# Patient Record
Sex: MALE | Race: WHITE | URBAN - METROPOLITAN AREA
[De-identification: names, ages, dates, MRNs, and addresses within clinical notes are randomized per-mention and may not be internally consistent; named-entity substitution may affect disease eponyms.]

---

## 2021-04-26 ENCOUNTER — EMERGENCY (EMERGENCY)
Facility: HOSPITAL | Age: 26
LOS: 1 days | Discharge: HOME | End: 2021-04-26
Attending: EMERGENCY MEDICINE
Payer: MEDICAID

## 2021-04-26 VITALS
RESPIRATION RATE: 16 BRPM | DIASTOLIC BLOOD PRESSURE: 87 MMHG | HEIGHT: 76 IN | TEMPERATURE: 99 F | WEIGHT: 197.09 LBS | OXYGEN SATURATION: 98 % | SYSTOLIC BLOOD PRESSURE: 130 MMHG | HEART RATE: 109 BPM

## 2021-04-26 LAB
ALBUMIN SERPL ELPH-MCNC: 4.5 G/DL — SIGNIFICANT CHANGE UP (ref 3.3–5.2)
ALP SERPL-CCNC: 65 U/L — SIGNIFICANT CHANGE UP (ref 40–120)
ALT FLD-CCNC: 19 U/L — SIGNIFICANT CHANGE UP
ANION GAP SERPL CALC-SCNC: 13 MMOL/L — SIGNIFICANT CHANGE UP (ref 5–17)
ANION GAP SERPL CALC-SCNC: 17 MMOL/L — SIGNIFICANT CHANGE UP (ref 5–17)
AST SERPL-CCNC: 18 U/L — SIGNIFICANT CHANGE UP
BASOPHILS # BLD AUTO: 0.06 K/UL — SIGNIFICANT CHANGE UP (ref 0–0.2)
BASOPHILS # BLD AUTO: 0.07 K/UL — SIGNIFICANT CHANGE UP (ref 0–0.2)
BASOPHILS NFR BLD AUTO: 0.5 % — SIGNIFICANT CHANGE UP (ref 0–2)
BASOPHILS NFR BLD AUTO: 0.5 % — SIGNIFICANT CHANGE UP (ref 0–2)
BILIRUB SERPL-MCNC: 0.5 MG/DL — SIGNIFICANT CHANGE UP (ref 0.4–2)
BLD GP AB SCN SERPL QL: SIGNIFICANT CHANGE UP
BUN SERPL-MCNC: 21 MG/DL — HIGH (ref 8–20)
BUN SERPL-MCNC: 21 MG/DL — HIGH (ref 8–20)
CALCIUM SERPL-MCNC: 8.8 MG/DL — SIGNIFICANT CHANGE UP (ref 8.6–10.2)
CALCIUM SERPL-MCNC: 9.6 MG/DL — SIGNIFICANT CHANGE UP (ref 8.6–10.2)
CHLORIDE SERPL-SCNC: 100 MMOL/L — SIGNIFICANT CHANGE UP (ref 98–107)
CHLORIDE SERPL-SCNC: 103 MMOL/L — SIGNIFICANT CHANGE UP (ref 98–107)
CO2 SERPL-SCNC: 20 MMOL/L — LOW (ref 22–29)
CO2 SERPL-SCNC: 24 MMOL/L — SIGNIFICANT CHANGE UP (ref 22–29)
CREAT SERPL-MCNC: 1 MG/DL — SIGNIFICANT CHANGE UP (ref 0.5–1.3)
CREAT SERPL-MCNC: 1.03 MG/DL — SIGNIFICANT CHANGE UP (ref 0.5–1.3)
EOSINOPHIL # BLD AUTO: 0 K/UL — SIGNIFICANT CHANGE UP (ref 0–0.5)
EOSINOPHIL # BLD AUTO: 0.06 K/UL — SIGNIFICANT CHANGE UP (ref 0–0.5)
EOSINOPHIL NFR BLD AUTO: 0 % — SIGNIFICANT CHANGE UP (ref 0–6)
EOSINOPHIL NFR BLD AUTO: 0.5 % — SIGNIFICANT CHANGE UP (ref 0–6)
GLUCOSE SERPL-MCNC: 146 MG/DL — HIGH (ref 70–99)
GLUCOSE SERPL-MCNC: 157 MG/DL — HIGH (ref 70–99)
HCT VFR BLD CALC: 48.4 % — SIGNIFICANT CHANGE UP (ref 39–50)
HCT VFR BLD CALC: 48.8 % — SIGNIFICANT CHANGE UP (ref 39–50)
HGB BLD-MCNC: 17 G/DL — SIGNIFICANT CHANGE UP (ref 13–17)
HGB BLD-MCNC: 17.3 G/DL — HIGH (ref 13–17)
IMM GRANULOCYTES NFR BLD AUTO: 0.3 % — SIGNIFICANT CHANGE UP (ref 0–1.5)
IMM GRANULOCYTES NFR BLD AUTO: 0.3 % — SIGNIFICANT CHANGE UP (ref 0–1.5)
LACTATE BLDV-MCNC: 3.2 MMOL/L — HIGH (ref 0.5–2)
LITHIUM SERPL-MCNC: 0.12 MMOL/L — LOW (ref 0.5–1.5)
LYMPHOCYTES # BLD AUTO: 0.65 K/UL — LOW (ref 1–3.3)
LYMPHOCYTES # BLD AUTO: 0.78 K/UL — LOW (ref 1–3.3)
LYMPHOCYTES # BLD AUTO: 4.7 % — LOW (ref 13–44)
LYMPHOCYTES # BLD AUTO: 6.9 % — LOW (ref 13–44)
MCHC RBC-ENTMCNC: 31.6 PG — SIGNIFICANT CHANGE UP (ref 27–34)
MCHC RBC-ENTMCNC: 31.7 PG — SIGNIFICANT CHANGE UP (ref 27–34)
MCHC RBC-ENTMCNC: 35.1 GM/DL — SIGNIFICANT CHANGE UP (ref 32–36)
MCHC RBC-ENTMCNC: 35.5 GM/DL — SIGNIFICANT CHANGE UP (ref 32–36)
MCV RBC AUTO: 89.1 FL — SIGNIFICANT CHANGE UP (ref 80–100)
MCV RBC AUTO: 90.1 FL — SIGNIFICANT CHANGE UP (ref 80–100)
MONOCYTES # BLD AUTO: 1.25 K/UL — HIGH (ref 0–0.9)
MONOCYTES # BLD AUTO: 1.38 K/UL — HIGH (ref 0–0.9)
MONOCYTES NFR BLD AUTO: 12.2 % — SIGNIFICANT CHANGE UP (ref 2–14)
MONOCYTES NFR BLD AUTO: 9 % — SIGNIFICANT CHANGE UP (ref 2–14)
NEUTROPHILS # BLD AUTO: 11.89 K/UL — HIGH (ref 1.8–7.4)
NEUTROPHILS # BLD AUTO: 9 K/UL — HIGH (ref 1.8–7.4)
NEUTROPHILS NFR BLD AUTO: 79.6 % — HIGH (ref 43–77)
NEUTROPHILS NFR BLD AUTO: 85.5 % — HIGH (ref 43–77)
PLATELET # BLD AUTO: 229 K/UL — SIGNIFICANT CHANGE UP (ref 150–400)
PLATELET # BLD AUTO: 246 K/UL — SIGNIFICANT CHANGE UP (ref 150–400)
POTASSIUM SERPL-MCNC: 4.5 MMOL/L — SIGNIFICANT CHANGE UP (ref 3.5–5.3)
POTASSIUM SERPL-MCNC: 4.6 MMOL/L — SIGNIFICANT CHANGE UP (ref 3.5–5.3)
POTASSIUM SERPL-SCNC: 4.5 MMOL/L — SIGNIFICANT CHANGE UP (ref 3.5–5.3)
POTASSIUM SERPL-SCNC: 4.6 MMOL/L — SIGNIFICANT CHANGE UP (ref 3.5–5.3)
PROT SERPL-MCNC: 7.3 G/DL — SIGNIFICANT CHANGE UP (ref 6.6–8.7)
RBC # BLD: 5.37 M/UL — SIGNIFICANT CHANGE UP (ref 4.2–5.8)
RBC # BLD: 5.48 M/UL — SIGNIFICANT CHANGE UP (ref 4.2–5.8)
RBC # FLD: 11.8 % — SIGNIFICANT CHANGE UP (ref 10.3–14.5)
RBC # FLD: 11.9 % — SIGNIFICANT CHANGE UP (ref 10.3–14.5)
SODIUM SERPL-SCNC: 137 MMOL/L — SIGNIFICANT CHANGE UP (ref 135–145)
SODIUM SERPL-SCNC: 140 MMOL/L — SIGNIFICANT CHANGE UP (ref 135–145)
WBC # BLD: 11.31 K/UL — HIGH (ref 3.8–10.5)
WBC # BLD: 13.9 K/UL — HIGH (ref 3.8–10.5)
WBC # FLD AUTO: 11.31 K/UL — HIGH (ref 3.8–10.5)
WBC # FLD AUTO: 13.9 K/UL — HIGH (ref 3.8–10.5)

## 2021-04-26 PROCEDURE — 83605 ASSAY OF LACTIC ACID: CPT

## 2021-04-26 PROCEDURE — G1004: CPT

## 2021-04-26 PROCEDURE — 80178 ASSAY OF LITHIUM: CPT

## 2021-04-26 PROCEDURE — 85730 THROMBOPLASTIN TIME PARTIAL: CPT

## 2021-04-26 PROCEDURE — 86850 RBC ANTIBODY SCREEN: CPT

## 2021-04-26 PROCEDURE — 74177 CT ABD & PELVIS W/CONTRAST: CPT

## 2021-04-26 PROCEDURE — 96374 THER/PROPH/DIAG INJ IV PUSH: CPT

## 2021-04-26 PROCEDURE — 86900 BLOOD TYPING SEROLOGIC ABO: CPT

## 2021-04-26 PROCEDURE — 96361 HYDRATE IV INFUSION ADD-ON: CPT

## 2021-04-26 PROCEDURE — 93010 ELECTROCARDIOGRAM REPORT: CPT

## 2021-04-26 PROCEDURE — 86901 BLOOD TYPING SEROLOGIC RH(D): CPT

## 2021-04-26 PROCEDURE — 80048 BASIC METABOLIC PNL TOTAL CA: CPT

## 2021-04-26 PROCEDURE — 85025 COMPLETE CBC W/AUTO DIFF WBC: CPT

## 2021-04-26 PROCEDURE — 85610 PROTHROMBIN TIME: CPT

## 2021-04-26 PROCEDURE — 96375 TX/PRO/DX INJ NEW DRUG ADDON: CPT

## 2021-04-26 PROCEDURE — 80053 COMPREHEN METABOLIC PANEL: CPT

## 2021-04-26 PROCEDURE — 36415 COLL VENOUS BLD VENIPUNCTURE: CPT

## 2021-04-26 PROCEDURE — 99285 EMERGENCY DEPT VISIT HI MDM: CPT

## 2021-04-26 PROCEDURE — 99284 EMERGENCY DEPT VISIT MOD MDM: CPT | Mod: 25

## 2021-04-26 PROCEDURE — 93005 ELECTROCARDIOGRAM TRACING: CPT

## 2021-04-26 PROCEDURE — 74177 CT ABD & PELVIS W/CONTRAST: CPT | Mod: 26,ME

## 2021-04-26 RX ORDER — MULTIVIT WITH MIN/MFOLATE/K2 340-15/3 G
1 POWDER (GRAM) ORAL ONCE
Refills: 0 | Status: COMPLETED | OUTPATIENT
Start: 2021-04-26 | End: 2021-04-26

## 2021-04-26 RX ORDER — SENNA PLUS 8.6 MG/1
2 TABLET ORAL ONCE
Refills: 0 | Status: COMPLETED | OUTPATIENT
Start: 2021-04-26 | End: 2021-04-26

## 2021-04-26 RX ORDER — SODIUM CHLORIDE 9 MG/ML
1000 INJECTION, SOLUTION INTRAVENOUS ONCE
Refills: 0 | Status: COMPLETED | OUTPATIENT
Start: 2021-04-26 | End: 2021-04-26

## 2021-04-26 RX ORDER — SODIUM CHLORIDE 9 MG/ML
1000 INJECTION INTRAMUSCULAR; INTRAVENOUS; SUBCUTANEOUS ONCE
Refills: 0 | Status: COMPLETED | OUTPATIENT
Start: 2021-04-26 | End: 2021-04-26

## 2021-04-26 RX ORDER — ACETAMINOPHEN 500 MG
1000 TABLET ORAL ONCE
Refills: 0 | Status: COMPLETED | OUTPATIENT
Start: 2021-04-26 | End: 2021-04-26

## 2021-04-26 RX ORDER — SODIUM CHLORIDE 9 MG/ML
1000 INJECTION, SOLUTION INTRAVENOUS
Refills: 0 | Status: DISCONTINUED | OUTPATIENT
Start: 2021-04-26 | End: 2021-05-01

## 2021-04-26 RX ORDER — ONDANSETRON 8 MG/1
4 TABLET, FILM COATED ORAL EVERY 6 HOURS
Refills: 0 | Status: DISCONTINUED | OUTPATIENT
Start: 2021-04-26 | End: 2021-05-01

## 2021-04-26 RX ORDER — MORPHINE SULFATE 50 MG/1
4 CAPSULE, EXTENDED RELEASE ORAL ONCE
Refills: 0 | Status: DISCONTINUED | OUTPATIENT
Start: 2021-04-26 | End: 2021-04-26

## 2021-04-26 RX ORDER — SENNA PLUS 8.6 MG/1
1 TABLET ORAL
Qty: 14 | Refills: 0
Start: 2021-04-26 | End: 2021-05-09

## 2021-04-26 RX ORDER — ONDANSETRON 8 MG/1
4 TABLET, FILM COATED ORAL ONCE
Refills: 0 | Status: COMPLETED | OUTPATIENT
Start: 2021-04-26 | End: 2021-04-26

## 2021-04-26 RX ORDER — ACETAMINOPHEN 500 MG
650 TABLET ORAL ONCE
Refills: 0 | Status: COMPLETED | OUTPATIENT
Start: 2021-04-26 | End: 2021-04-26

## 2021-04-26 RX ORDER — DOCUSATE SODIUM 100 MG
1 CAPSULE ORAL
Qty: 28 | Refills: 0
Start: 2021-04-26 | End: 2021-05-09

## 2021-04-26 RX ADMIN — SODIUM CHLORIDE 1000 MILLILITER(S): 9 INJECTION, SOLUTION INTRAVENOUS at 23:05

## 2021-04-26 RX ADMIN — Medication 1 BOTTLE: at 21:26

## 2021-04-26 RX ADMIN — SODIUM CHLORIDE 1000 MILLILITER(S): 9 INJECTION, SOLUTION INTRAVENOUS at 21:23

## 2021-04-26 RX ADMIN — ONDANSETRON 4 MILLIGRAM(S): 8 TABLET, FILM COATED ORAL at 19:27

## 2021-04-26 RX ADMIN — SENNA PLUS 2 TABLET(S): 8.6 TABLET ORAL at 21:27

## 2021-04-26 RX ADMIN — SODIUM CHLORIDE 1000 MILLILITER(S): 9 INJECTION INTRAMUSCULAR; INTRAVENOUS; SUBCUTANEOUS at 15:36

## 2021-04-26 RX ADMIN — Medication 650 MILLIGRAM(S): at 17:10

## 2021-04-26 RX ADMIN — SODIUM CHLORIDE 1000 MILLILITER(S): 9 INJECTION INTRAMUSCULAR; INTRAVENOUS; SUBCUTANEOUS at 21:27

## 2021-04-26 RX ADMIN — SODIUM CHLORIDE 1000 MILLILITER(S): 9 INJECTION INTRAMUSCULAR; INTRAVENOUS; SUBCUTANEOUS at 19:26

## 2021-04-26 RX ADMIN — Medication 650 MILLIGRAM(S): at 15:36

## 2021-04-26 RX ADMIN — MORPHINE SULFATE 4 MILLIGRAM(S): 50 CAPSULE, EXTENDED RELEASE ORAL at 19:27

## 2021-04-26 RX ADMIN — Medication 400 MILLIGRAM(S): at 21:24

## 2021-04-26 RX ADMIN — Medication 1 ENEMA: at 21:26

## 2021-04-26 RX ADMIN — SODIUM CHLORIDE 1000 MILLILITER(S): 9 INJECTION, SOLUTION INTRAVENOUS at 22:20

## 2021-04-26 NOTE — ED PROVIDER NOTE - PATIENT PORTAL LINK FT
You can access the FollowMyHealth Patient Portal offered by [facility] by registering at the following website: [email or web address]. By joining [facility]’s FollowMyHealth portal, you will also be able to view your health information using other applications (apps) compatible with our system.

## 2021-04-26 NOTE — ED PROVIDER NOTE - NS ED ROS FT
Constitutional: no fever, +chills, +decreased appetite   Head: NC, AT   Eyes: no redness   ENMT: no nasal congestion/drainage, no sore throat   CV: no chest pain, no edema  Resp: no cough, no dyspnea  GI: +abdominal pain, no nausea, +vomiting, no diarrhea, +constipation   : no dysuria, no hematuria   Skin: no lesions, no rashes   Neuro: no LOC, no headache, no sensory deficits, no weakness

## 2021-04-26 NOTE — ED PROVIDER NOTE - PROGRESS NOTE DETAILS
Richard: Dr. Cope reviewed CT findings with radiology - no clear transition point however abdomen distended/fluid filled. Differential includes ileus and less likely pseudoobstruction, possibility psych meds c contributing. Colorectal surgery consulted. Patient will be updated. Sloan COLLAZO: patient signed out to me pending colorectal consult. colorectal recommending enema/mg citrate/senna/IVF, rpt labs at 10PM and reassess. Surgery recommending discharge with bowel regimen and GI follow up. Will send medications to pharmacy and discharge.

## 2021-04-26 NOTE — CONSULT NOTE ADULT - ASSESSMENT
26M, history of chronic constipation, presenting with abdominal pain x1-day. CT A/P demonstrating colonic distention with heavy stool burden.    -Colonic distention with heavy stool burden, no evidence of bowel obstruction, No signs of peritonitis   -No acute surgical intervention indicated at this time  -Recommend ED Observation  -Maintain  IVF resuscitation   -Recommend aggressive bowel regimen, including enemas and PO laxatives   -Repeat CBC/BMP/Lactate at 10PM  -Pain control, limit narcotics   -Antinausea medication   -Will f/u repeat labs and maintain ADDIE while inpatient

## 2021-04-26 NOTE — ED PROVIDER NOTE - PHYSICAL EXAMINATION
General: NAD, lying supine   Head: NC, AT  EENT: EOMI, no scleral icterus  Cardiac: tachycardic, no lower extremity edema  Respiratory: CTAB, no respiratory distress   Abdomen: soft, ND, moderate ttp in LLQ and suprapubic region, nonperitonitic  MSK/Vascular: full ROM, soft compartments, warm extremities  Neuro: AAOx3, sensation to light touch intact  Psych: flat affect

## 2021-04-26 NOTE — ED PROVIDER NOTE - CARE PROVIDER_API CALL
Nena Gandara (DO)  Gastroenterology  [address], [address]  [place] [address]  Phone: [phone]  Fax: [phone]  Follow Up Time:

## 2021-04-26 NOTE — CONSULT NOTE ADULT - SUBJECTIVE AND OBJECTIVE BOX
COLORECTAL SURGERY CONSULT    HPI: 26M, PMH of Bipolar 1, on Clozapine, chronic constipation, presenting from [facility] psychiatric facility for evaluation of abdominal pain. Patient reports 8/10, non-radiating, sharp abdominal pain since this morning. Pain is associated with nausea, 2x episodes of emesis, and decreased appetite. No similar symptoms in the past. Patient reports that discomfort is unbearable. Last BM 3-hours ago following an enema. He does not recall his last flatus. Afebrile. Mildly tachycardic. No HA, chills, SOB.        PAST MEDICAL HISTORY:  Constipation        PAST SURGICAL HISTORY:      ALLERGIES:  Lamictal (Unknown)      FAMILY HISTORY: Noncontributory    SOCIAL HISTORY: Denies tobacco, EtOH, illicit substance use.     HOME MEDICATIONS:    MEDICATIONS  (STANDING):    MEDICATIONS  (PRN):      VITALS & I/Os:  Vital Signs Last 24 Hrs  T(C): 37.2 (26 Apr 2021 19:29), Max: 37.4 (26 Apr 2021 18:11)  T(F): 98.9 (26 Apr 2021 19:29), Max: 99.4 (26 Apr 2021 18:11)  HR: 120 (26 Apr 2021 19:29) (109 - 128)  BP: 138/80 (26 Apr 2021 19:29) (130/87 - 145/67)  BP(mean): --  RR: 18 (26 Apr 2021 19:29) (16 - 18)  SpO2: 95% (26 Apr 2021 19:29) (95% - 98%)  CAPILLARY BLOOD GLUCOSE          I&O's Summary      GENERAL: Alert, well developed, in no acute distress.  MENTAL STATUS: AAOx3  HEENT: PERRLA. EOMI. MMM  RESPIRATORY: CTAB. No wheezing, rales or rhonchi.  CARDIOVASCULAR: RRR. No audible murmurs, rubs or gallops.   GASTROINTESTINAL: Abdomen softLY distended, mildly TTP 4/4 quadrants, no rebound or guarding       LABS:                        17.3   13.90 )-----------( 229      ( 26 Apr 2021 15:44 )             48.8     04-26    137  |  100  |  21.0<H>  ----------------------------<  146<H>  4.6   |  24.0  |  1.03    Ca    9.6      26 Apr 2021 15:44    TPro  7.3  /  Alb  4.5  /  TBili  0.5  /  DBili  x   /  AST  18  /  ALT  19  /  AlkPhos  65  04-26    Lactate:    PT/INR - ( 26 Apr 2021 17:01 )   PT: 13.2 sec;   INR: 1.15 ratio         PTT - ( 26 Apr 2021 17:01 )  PTT:34.1 sec          IMAGING:  CT A/P: BOWEL: The colon is distended and fluid-filled across its entirety. The appearance is suggestive of an ileus. There is some well-formed stool in the rectum which is not overly distended nevertheless this could be the cause of the colonic distention. The colon wall is thin throughout except for a short segment at the splenic flexure with associated mild wall thickening and mild inflammatory change in the adjacent fat (5:67). This may represent a small focus of developing colitis. The colonic fluid extends through the ileocecal valve into the distal terminal ileum, but there is no small bowel obstruction.  PERITONEUM: No ascites.  VESSELS: Within normal limits.  RETROPERITONEUM/LYMPH NODES: No lymphadenopathy.  ABDOMINAL WALL: Within normal limits.  BONES: Within normal limits.

## 2021-04-26 NOTE — ED PROVIDER NOTE - ATTENDING CONTRIBUTION TO CARE
Prisca: I performed a face to face evaluation of this patient and performed a full history and physical examination on the patient.  I agree with the resident's history, physical examination, and plan of the patient unless otherwise noted. My brief assessment is as follows: lower abd pain with constipation and chills starting overnight. given laxative/enema, had hard bm, no relief. no testicular pain, no urianry symptoms. denies other complaints. from [facility]. non toxic, mild discomfort. ctab, tachy ~115, abd soft with lower ttp R>L. neuro intact. labs, ct, symptom control, reassess

## 2021-04-26 NOTE — ED ADULT TRIAGE NOTE - CHIEF COMPLAINT QUOTE
pt BIBA from [facility] for 5/10 abdominal pain and diarrhea since 2am. was given laxatives with no relief of pain. denies fever, chills, urinary sx. [facility] aide @ bedside

## 2021-04-26 NOTE — ED PROVIDER NOTE - OBJECTIVE STATEMENT
26 year old male from [place] for bipolar disorder who presents with abdominal pain. At 02:00 the patient woke up from sleep with 7/10 stabbing/cramping abdominal pain worst in LLQ with some radiation to the right. States the pain is constant. Has decreased appetite, 1 episode of NB emesis, and has chills. He has constipation at baseline and was given fleet enema, mag citrate, and ducolax PTA; last BM about 3 hours ago.   No history of abdominal surgery. Family history: No family history of IBD, Celiac, Crohn's, appendecitis

## 2021-04-26 NOTE — ED ADULT NURSE REASSESSMENT NOTE - NS ED NURSE REASSESS COMMENT FT1
pts aide at bedside  pt sleeping off and on.    martina CT scan.   vomited after CT scan, for clear yellow.   waiting MD.   reassurance prov

## 2021-04-26 NOTE — ED ADULT NURSE REASSESSMENT NOTE - NS ED NURSE REASSESS COMMENT FT1
pt with large BM. walked back to bed and states relief of discomfort. no complaints. no distress noted. pending sx reeval

## 2021-04-26 NOTE — ED PROVIDER NOTE - CLINICAL SUMMARY MEDICAL DECISION MAKING FREE TEXT BOX
26 year old male from [place] for bipolar disorder who presents with abdominal pain. Patient tachycardic in 120s initially. Labs notable for WBC of 13.9. CT a/p pending. 26 year old male from [place] for bipolar disorder who presents with abdominal pain. Patient tachycardic in 120s initially. Labs notable for WBC of 13.9. CT a/p showing ileus vs less likely pseudocyst. Colorectal surgery consulted.

## 2021-04-26 NOTE — ED ADULT NURSE REASSESSMENT NOTE - NS ED NURSE REASSESS COMMENT FT1
received pt aao x4. respirations even and unlabored. skin color wnl warm and dry. c/o abd pain. tachy on cm 120-130bpm. MD aware, medicated as ordered wcm lv

## 2021-04-26 NOTE — CHART NOTE - NSCHARTNOTEFT_GEN_A_CORE
Patient examined s/p bowel regimen. Patient with multiple normal appearing bowel movements. Abdominal pain improved. No further episodes of nausea or vomiting. Recommend maintain bowel regimen on discharge. Patient should follow-up with GI as outpatient given history of chronic constipation. Cleared from surgical standpoint for disposition per ED.

## 2021-04-26 NOTE — ED ADULT NURSE NOTE - CHIEF COMPLAINT QUOTE
The patient has evidence of elevated CPK in the past. Isoenzymes were ordered which showed a predominantly MM subtype. He was advised to stop working out and stop his statin medicine in the past and this only seems to have improved his CPK somewhat. Patient denies muscle aches or pains.   pt BIBA from [facility] for 5/10 abdominal pain and diarrhea since 2am. was given laxatives with no relief of pain. denies fever, chills, urinary sx. [facility] aide @ bedside

## 2021-04-26 NOTE — ED PROVIDER NOTE - NSFOLLOWUPINSTRUCTIONS_ED_ALL_ED_FT
Constipation is when a person has fewer than three bowel movements in a week, has difficulty having a bowel movement, or has stools (feces) that are dry, hard, or larger than normal. Constipation may be caused by an underlying condition. It may become worse with age if a person takes certain medicines and does not take in enough fluids. Constipation is when a person has fewer than three bowel movements in a week, has difficulty having a bowel movement, or has stools (feces) that are dry, hard, or larger than normal. Constipation may be caused by an underlying condition. It may become worse with age if a person takes certain medicines and does not take in enough fluids.    Take the senna and colace as prescribed

## 2021-04-27 VITALS
RESPIRATION RATE: 18 BRPM | TEMPERATURE: 98 F | OXYGEN SATURATION: 99 % | HEART RATE: 112 BPM | SYSTOLIC BLOOD PRESSURE: 132 MMHG | DIASTOLIC BLOOD PRESSURE: 80 MMHG

## 2021-04-27 RX ADMIN — Medication 1000 MILLIGRAM(S): at 00:31

## 2021-04-27 RX ADMIN — SODIUM CHLORIDE 1000 MILLILITER(S): 9 INJECTION, SOLUTION INTRAVENOUS at 00:32

## 2021-04-27 NOTE — ED ADULT NURSE REASSESSMENT NOTE - NS ED NURSE REASSESS COMMENT FT1
pt heart rate still elevated. pt has no complaints. dr padilla aware. pt still cleared for dc and no new orders at this time.

## 2021-05-03 ENCOUNTER — APPOINTMENT (OUTPATIENT)
Dept: DERMATOLOGY | Facility: CLINIC | Age: 26
End: 2021-05-03
Payer: MEDICAID

## 2021-05-03 PROCEDURE — 99202 OFFICE O/P NEW SF 15 MIN: CPT

## 2021-05-03 PROCEDURE — 99072 ADDL SUPL MATRL&STAF TM PHE: CPT

## 2021-07-20 PROBLEM — Z00.00 ENCOUNTER FOR PREVENTIVE HEALTH EXAMINATION: Status: ACTIVE | Noted: 2021-07-20

## 2021-08-11 ENCOUNTER — OUTPATIENT (OUTPATIENT)
Dept: OUTPATIENT SERVICES | Facility: HOSPITAL | Age: 26
LOS: 1 days | End: 2021-08-11
Payer: COMMERCIAL

## 2021-08-11 ENCOUNTER — APPOINTMENT (OUTPATIENT)
Dept: NEUROLOGY | Facility: CLINIC | Age: 26
End: 2021-08-11

## 2021-08-11 DIAGNOSIS — Z00.8 ENCOUNTER FOR OTHER GENERAL EXAMINATION: ICD-10-CM

## 2021-08-11 PROCEDURE — U0005: CPT

## 2021-08-11 PROCEDURE — U0003: CPT

## 2021-09-21 ENCOUNTER — NON-APPOINTMENT (OUTPATIENT)
Age: 26
End: 2021-09-21

## 2021-09-21 ENCOUNTER — APPOINTMENT (OUTPATIENT)
Dept: OPHTHALMOLOGY | Facility: CLINIC | Age: 26
End: 2021-09-21
Payer: MEDICAID

## 2021-09-21 PROCEDURE — 99072 ADDL SUPL MATRL&STAF TM PHE: CPT

## 2021-09-21 PROCEDURE — 92004 COMPRE OPH EXAM NEW PT 1/>: CPT

## 2021-09-27 ENCOUNTER — APPOINTMENT (OUTPATIENT)
Dept: NEUROLOGY | Facility: CLINIC | Age: 26
End: 2021-09-27
Payer: MEDICAID

## 2021-09-27 VITALS
DIASTOLIC BLOOD PRESSURE: 72 MMHG | OXYGEN SATURATION: 98 % | HEART RATE: 102 BPM | SYSTOLIC BLOOD PRESSURE: 108 MMHG | BODY MASS INDEX: 30.89 KG/M2 | HEIGHT: 73 IN | WEIGHT: 233.1 LBS | TEMPERATURE: 98.8 F

## 2021-09-27 DIAGNOSIS — F41.9 ANXIETY DISORDER, UNSPECIFIED: ICD-10-CM

## 2021-09-27 DIAGNOSIS — F32.9 ANXIETY DISORDER, UNSPECIFIED: ICD-10-CM

## 2021-09-27 DIAGNOSIS — Z78.9 OTHER SPECIFIED HEALTH STATUS: ICD-10-CM

## 2021-09-27 DIAGNOSIS — Z01.818 ENCOUNTER FOR OTHER PREPROCEDURAL EXAMINATION: ICD-10-CM

## 2021-09-27 DIAGNOSIS — Z82.0 FAMILY HISTORY OF EPILEPSY AND OTHER DISEASES OF THE NERVOUS SYSTEM: ICD-10-CM

## 2021-09-27 PROCEDURE — 99205 OFFICE O/P NEW HI 60 MIN: CPT

## 2021-09-27 RX ORDER — METOPROLOL TARTRATE 75 MG/1
TABLET, FILM COATED ORAL
Refills: 0 | Status: ACTIVE | COMMUNITY

## 2021-09-27 RX ORDER — CLOZAPINE 200 MG/1
200 TABLET, ORALLY DISINTEGRATING ORAL
Refills: 0 | Status: ACTIVE | COMMUNITY

## 2021-09-27 NOTE — ASSESSMENT
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 Admit to Epilepsy Monitoring Unit (EMU) for further characterization. MRI brain.\par \par \par - Admit to EMU 10/6. The purposes of the EMU admission are 1) to differentiate epileptic from nonepileptic events and 2) to establish the specific type of epilepsy in poorly characterized seizure types where such characterization is medically necessary to select the most appropriate therapeutic regimen. The expected length of stay is 3-4 days.\par \par - MRI brain w/o, epilepsy protocol\par \par - Patient was educated regarding risks and driving privileges associated with the [facility] Guidelines. Patient is educated on seizure precautions, including no driving, no operating machinery, no swimming or bathing, no climbing heights, or engage in any risky activities during which a seizure could cause further injury to pt or others. \par  \par - f/u in 3 months\par \par \par Personally reviewed all images, labs and other studies. More than 50% of time spent on counseling and educating patient on differential, workup, disease course, and treatment/management. All questions and concerns answered and addressed in detail to patient's complete satisfaction. Patient verbalized understanding and agreed to plan.\par

## 2021-09-27 NOTE — CONSULT LETTER
[Dear  ___] : Dear  [unfilled], [Sincerely,] : Sincerely, [FreeTextEntry1] : I had the pleasure of seeing your patient, DEON AGOSTO, in my office today. Please see my note below. \par \par If you have any questions, please do not hesitate to contact me.  [FreeTextEntry3] : Gold Noel MD\par Director, Epilepsy/EMU\par [facility] \par [facility] [place]\par [address] [place] [address] \par Tel: [phone]; Fax: [phone]

## 2021-09-27 NOTE — PHYSICAL EXAM
[FreeTextEntry1] : GENERAL PHYSICAL EXAM:\par GEN: no distress, restricted affect\par HEENT: NCAT, OP clear\par EYES: sclera white, conjunctiva clear, no nystagmus\par NECK: supple\par CV: RRR    		\par PULM: CTAB, no wheezing\par GI: soft ABD, +BS, NT, ND\par EXT: peripheral pulse intact, no cyanosis\par MSK: muscle tone and strength normal\par SKIN: warm, dry, no rash or lesion on exposed skin \par \par NEUROLOGICAL EXAM:\par Mental Status\par Orientation: alert and oriented to person, place, time, and situation \par Language: clear and fluent\par \par Cranial Nerves\par II: full visual fields intact \par III, IV, VI: PERRL, EOMI\par V, VII: facial sensation and movement intact and symmetric \par VIII: hearing intact \par IX, X: uvula midline, soft palate elevates normally \par XI: BL shoulder shrug intact \par XII: tongue midline\par \par Motor\par Shoulder abd: 5 (R), 5 (L)\par EF/EE: 5 (R), 5 (L)\par WF/WE: 5 (R), 5 (L)\par hand : 5 (R), 5 (L)\par HF/HE: 5 (R), 5 (L)\par KF/KE: 5 (R), 5 (L)\par DF/PF: 5 (R), 5 (L)                \par Tone and bulk are normal in upper and lower limbs\par No pronator drift\par \par Sensation\par Intact to light touch and pinprick in all 4 EXTs\par \par Reflex\par 2+ in BL biceps, triceps, brachioradialis, patella, ankle                                    \par Plantar responses downward bilaterally\par \par Coordination\par Normal FTN bilaterally\par \par Gait\par Normal stance, stride, and pivot turn\par Tandem walk intact\par Negative Romberg\par \par

## 2021-09-27 NOTE — HISTORY OF PRESENT ILLNESS
[FreeTextEntry1] : PCP: Dr. Lynch \par \par This is a 26 year-old LH male with a history of depression and anxiety who is referred by PCP ALDEN LYNCH MD for evaluation and management of seizure and seizure-like events. \par \par Patient has had 2 BTC sz’s (type #1) in teenage and early adulthood. Then earlier this year, began to have myoclonic twitching of the jaw (type #2) and zoning out episodes (type #3).\par \par SEIZURE/SPELL DESCRIPTION AND TYPE:\par Type #1\par Severity: BTC sz\par Onset: 13yo\par Quality & associated signs/symptoms: no aura -> LOC, full body convulsion\par Duration:\par Timing: x1 at 13yo, x1 at 15yo\par Modifying factors: triggered by flashing light while playing video game\par Diurnal Variation: none\par \par Type #2\par Severity: myoclonic twitching of mouth/jaw\par Onset: early 2021\par Quality & associated signs/symptoms: few myoclonic jerking of the jaw in a role\par Duration: few seconds\par Timing: maybe once a mouth\par Modifying factors: triggered by fatigue\par Diurnal Variation: none\par \par Type #3\par Severity: zoning out\par Onset: early 2021\par Quality & associated signs/symptoms: zoning out, immediately back to baseline\par Duration: few seconds\par Timing: once every few months\par Modifying factors: triggered by dehydration or fatigue  \par Diurnal Variation: none\par \par SEIZURE RISK FACTORS:\par Sister has epilepsy, onset 20s, characterized by focal aware, focal impaired awareness and focal to bilateral tonic clonic sz's. No history of febrile seizure, TBI, CNS infection.\par \par CURRENT AED:\par none\par \par PREVIOUS AED:\par LTG - for bipolar, rash\par \par IMAGING: \par no MRI brain\par \par NEUROPHYSIOLOGY:\par none\par \par NEUROPSYCHOLOGY: \par none\par \par PMH:\par bipolar disorder\par \par PSH:\par drainage of hydrocele\par \par ALLERGIES:\par LTG - rash\par \par FH:\par Sister has epilepsy, onset 20s, characterized by focal aware, focal impaired awareness and focal to bilateral tonic clonic sz's.\par \par SH:\par No E/T/D. Has license but not driving. Not working. [facility]

## 2021-10-03 ENCOUNTER — APPOINTMENT (OUTPATIENT)
Dept: DISASTER EMERGENCY | Facility: CLINIC | Age: 26
End: 2021-10-03

## 2021-10-03 ENCOUNTER — LABORATORY RESULT (OUTPATIENT)
Age: 26
End: 2021-10-03

## 2021-10-04 PROBLEM — Z00.00 ENCOUNTER FOR PREVENTIVE HEALTH EXAMINATION: Noted: 2021-10-04

## 2021-10-06 ENCOUNTER — INPATIENT (INPATIENT)
Facility: HOSPITAL | Age: 26
LOS: 1 days | Discharge: HOME | DRG: 101 | End: 2021-10-08
Attending: INTERNAL MEDICINE | Admitting: INTERNAL MEDICINE
Payer: MEDICAID

## 2021-10-06 VITALS
TEMPERATURE: 98 F | DIASTOLIC BLOOD PRESSURE: 80 MMHG | SYSTOLIC BLOOD PRESSURE: 117 MMHG | RESPIRATION RATE: 18 BRPM | HEART RATE: 69 BPM | OXYGEN SATURATION: 98 %

## 2021-10-06 DIAGNOSIS — G40.A11 ABSENCE EPILEPTIC SYNDROME, INTRACTABLE, WITH STATUS EPILEPTICUS: ICD-10-CM

## 2021-10-06 LAB
ALBUMIN SERPL ELPH-MCNC: 4.3 G/DL — SIGNIFICANT CHANGE UP (ref 3.3–5.2)
ALP SERPL-CCNC: 69 U/L — SIGNIFICANT CHANGE UP (ref 40–120)
ALT FLD-CCNC: 17 U/L — SIGNIFICANT CHANGE UP
ANION GAP SERPL CALC-SCNC: 11 MMOL/L — SIGNIFICANT CHANGE UP (ref 5–17)
AST SERPL-CCNC: 19 U/L — SIGNIFICANT CHANGE UP
BASOPHILS # BLD AUTO: 0.07 K/UL — SIGNIFICANT CHANGE UP (ref 0–0.2)
BASOPHILS NFR BLD AUTO: 1.1 % — SIGNIFICANT CHANGE UP (ref 0–2)
BILIRUB SERPL-MCNC: 0.4 MG/DL — SIGNIFICANT CHANGE UP (ref 0.4–2)
BUN SERPL-MCNC: 11.4 MG/DL — SIGNIFICANT CHANGE UP (ref 8–20)
CALCIUM SERPL-MCNC: 9.9 MG/DL — SIGNIFICANT CHANGE UP (ref 8.6–10.2)
CHLORIDE SERPL-SCNC: 107 MMOL/L — SIGNIFICANT CHANGE UP (ref 98–107)
CO2 SERPL-SCNC: 24 MMOL/L — SIGNIFICANT CHANGE UP (ref 22–29)
CREAT SERPL-MCNC: 1.08 MG/DL — SIGNIFICANT CHANGE UP (ref 0.5–1.3)
EOSINOPHIL # BLD AUTO: 0.12 K/UL — SIGNIFICANT CHANGE UP (ref 0–0.5)
EOSINOPHIL NFR BLD AUTO: 1.8 % — SIGNIFICANT CHANGE UP (ref 0–6)
GLUCOSE SERPL-MCNC: 83 MG/DL — SIGNIFICANT CHANGE UP (ref 70–99)
HCT VFR BLD CALC: 41.1 % — SIGNIFICANT CHANGE UP (ref 39–50)
HGB BLD-MCNC: 14.7 G/DL — SIGNIFICANT CHANGE UP (ref 13–17)
IMM GRANULOCYTES NFR BLD AUTO: 0.3 % — SIGNIFICANT CHANGE UP (ref 0–1.5)
INR BLD: 1.08 RATIO — SIGNIFICANT CHANGE UP (ref 0.88–1.16)
LYMPHOCYTES # BLD AUTO: 1.65 K/UL — SIGNIFICANT CHANGE UP (ref 1–3.3)
LYMPHOCYTES # BLD AUTO: 25 % — SIGNIFICANT CHANGE UP (ref 13–44)
MAGNESIUM SERPL-MCNC: 1.9 MG/DL — SIGNIFICANT CHANGE UP (ref 1.8–2.6)
MCHC RBC-ENTMCNC: 31.6 PG — SIGNIFICANT CHANGE UP (ref 27–34)
MCHC RBC-ENTMCNC: 35.8 GM/DL — SIGNIFICANT CHANGE UP (ref 32–36)
MCV RBC AUTO: 88.4 FL — SIGNIFICANT CHANGE UP (ref 80–100)
MONOCYTES # BLD AUTO: 0.57 K/UL — SIGNIFICANT CHANGE UP (ref 0–0.9)
MONOCYTES NFR BLD AUTO: 8.6 % — SIGNIFICANT CHANGE UP (ref 2–14)
NEUTROPHILS # BLD AUTO: 4.18 K/UL — SIGNIFICANT CHANGE UP (ref 1.8–7.4)
NEUTROPHILS NFR BLD AUTO: 63.2 % — SIGNIFICANT CHANGE UP (ref 43–77)
PHOSPHATE SERPL-MCNC: 3.8 MG/DL — SIGNIFICANT CHANGE UP (ref 2.4–4.7)
PLATELET # BLD AUTO: 170 K/UL — SIGNIFICANT CHANGE UP (ref 150–400)
POTASSIUM SERPL-MCNC: 4.5 MMOL/L — SIGNIFICANT CHANGE UP (ref 3.5–5.3)
POTASSIUM SERPL-SCNC: 4.5 MMOL/L — SIGNIFICANT CHANGE UP (ref 3.5–5.3)
PROT SERPL-MCNC: 6.9 G/DL — SIGNIFICANT CHANGE UP (ref 6.6–8.7)
PROTHROM AB SERPL-ACNC: 12.5 SEC — SIGNIFICANT CHANGE UP (ref 10.6–13.6)
RBC # BLD: 4.65 M/UL — SIGNIFICANT CHANGE UP (ref 4.2–5.8)
RBC # FLD: 11.7 % — SIGNIFICANT CHANGE UP (ref 10.3–14.5)
SODIUM SERPL-SCNC: 141 MMOL/L — SIGNIFICANT CHANGE UP (ref 135–145)
WBC # BLD: 6.61 K/UL — SIGNIFICANT CHANGE UP (ref 3.8–10.5)
WBC # FLD AUTO: 6.61 K/UL — SIGNIFICANT CHANGE UP (ref 3.8–10.5)

## 2021-10-06 PROCEDURE — 99223 1ST HOSP IP/OBS HIGH 75: CPT

## 2021-10-06 PROCEDURE — 99253 IP/OBS CNSLTJ NEW/EST LOW 45: CPT

## 2021-10-06 RX ORDER — SENNA PLUS 8.6 MG/1
2 TABLET ORAL AT BEDTIME
Refills: 0 | Status: DISCONTINUED | OUTPATIENT
Start: 2021-10-06 | End: 2021-10-08

## 2021-10-06 RX ORDER — CLOZAPINE 150 MG/1
225 TABLET, ORALLY DISINTEGRATING ORAL AT BEDTIME
Refills: 0 | Status: DISCONTINUED | OUTPATIENT
Start: 2021-10-06 | End: 2021-10-08

## 2021-10-06 RX ORDER — METOPROLOL TARTRATE 50 MG
25 TABLET ORAL
Refills: 0 | Status: DISCONTINUED | OUTPATIENT
Start: 2021-10-06 | End: 2021-10-08

## 2021-10-06 RX ORDER — CHOLECALCIFEROL (VITAMIN D3) 125 MCG
2000 CAPSULE ORAL DAILY
Refills: 0 | Status: DISCONTINUED | OUTPATIENT
Start: 2021-10-06 | End: 2021-10-08

## 2021-10-06 RX ORDER — ENOXAPARIN SODIUM 100 MG/ML
40 INJECTION SUBCUTANEOUS DAILY
Refills: 0 | Status: DISCONTINUED | OUTPATIENT
Start: 2021-10-06 | End: 2021-10-08

## 2021-10-06 RX ORDER — BUPROPION HYDROCHLORIDE 150 MG/1
300 TABLET, EXTENDED RELEASE ORAL DAILY
Refills: 0 | Status: DISCONTINUED | OUTPATIENT
Start: 2021-10-06 | End: 2021-10-08

## 2021-10-06 RX ORDER — INFLUENZA VIRUS VACCINE 15; 15; 15; 15 UG/.5ML; UG/.5ML; UG/.5ML; UG/.5ML
0.5 SUSPENSION INTRAMUSCULAR ONCE
Refills: 0 | Status: DISCONTINUED | OUTPATIENT
Start: 2021-10-06 | End: 2021-10-08

## 2021-10-06 RX ORDER — MULTIVIT WITH MIN/MFOLATE/K2 340-15/3 G
1 POWDER (GRAM) ORAL ONCE
Refills: 0 | Status: DISCONTINUED | OUTPATIENT
Start: 2021-10-06 | End: 2021-10-08

## 2021-10-06 RX ADMIN — Medication 25 MILLIGRAM(S): at 18:31

## 2021-10-06 RX ADMIN — SENNA PLUS 2 TABLET(S): 8.6 TABLET ORAL at 22:39

## 2021-10-06 RX ADMIN — CLOZAPINE 225 MILLIGRAM(S): 150 TABLET, ORALLY DISINTEGRATING ORAL at 22:39

## 2021-10-06 NOTE — CONSULT NOTE ADULT - ASSESSMENT
26 year-old  male with a history of depression and anxiety who presents to EMU to differentiate epileptic from nonepileptic events. Personally reviewed all imagines, labs and other studies.    Recommendation:  - cvEEG  - continue all home meds  - tele monitor  - bed rail up at all times  - bed rail padding  - OOB only with supervision and assist  - seizure precaution  - medical management and support care per primary team       Thank you for allowing Epilepsy to participate in the care of this patient.   ______________________  Gold Noel MD   Director, Epilepsy/EMU - [facility]   Epilepsy Consult #: [phone] ([phone])

## 2021-10-06 NOTE — H&P ADULT - HISTORY OF PRESENT ILLNESS
26 year-old  male with a history of HTN, Bipolar, constipation presents to EMU to differentiate epileptic from nonepileptic events. Patient has had 2 BTC sz’s (type #1) in teenage and early adulthood. Then earlier this year, began to have myoclonic twitching of the jaw (type #2) and zoning out episodes (type #3).    SH- Denies smoking, alcohol use or other drug use; lives on the grounds of [place]  FH- Denies any medical condition in his family

## 2021-10-06 NOTE — H&P ADULT - NSHPPHYSICALEXAM_GEN_ALL_CORE
Vital Signs Last 24 Hrs  T(C): 36.4 (10-06-21 @ 17:11), Max: 36.6 (10-06-21 @ 14:21)  T(F): 97.5 (10-06-21 @ 17:11), Max: 97.9 (10-06-21 @ 14:21)  HR: 79 (10-06-21 @ 17:11) (69 - 79)  BP: 112/77 (10-06-21 @ 17:11) (112/77 - 117/80)  BP(mean): --  RR: 18 (10-06-21 @ 17:11) (18 - 18)  SpO2: 99% (10-06-21 @ 17:11) (98% - 99%)    GENERAL: Comfortable, unable to give clear history/ medications  HEAD:  Atraumatic, Normocephalic  EYES: EOMI, conjunctiva and sclera clear  NECK: Supple, No JVD, Normal thyroid  NERVOUS SYSTEM:  Alert & Oriented X 3, Motor Strength 5/5 B/L upper and lower extremities  CHEST/LUNG: CTA bilaterally; No rales, rhonchi, wheezing, or rubs  HEART: Regular rate and rhythm; No murmurs, rubs, or gallops  ABDOMEN: Soft, Nontender, Nondistended; Bowel sounds present  EXTREMITIES:  2+ Peripheral Pulses, No clubbing, cyanosis, or edema  SKIN: No rashes or lesions

## 2021-10-06 NOTE — CONSULT NOTE ADULT - SUBJECTIVE AND OBJECTIVE BOX
[facility]                                                                     MD Mere Richmond DO                                              Epilepsy Consult #: [phone] ([phone])                                               Office: [address] [place] [address], [address]                                                 Phone: [phone]; Fax: [phone]                            ==============================================    EPILEPSY INITIAL CONSULT NOTE      HPI  This is a 26 year-old LH male with a history of depression and anxiety who presents to EMU to differentiate epileptic from nonepileptic events. Patient has had 2 BTC sz’s (type #1) in teenage and early adulthood. Then earlier this year, began to have myoclonic twitching of the jaw (type #2) and zoning out episodes (type #3).    SEIZURE/SPELL DESCRIPTION AND TYPE:  Type #1  Severity: BTC sz  Onset: 15yo  Quality & associated signs/symptoms: no aura -> LOC, full body convulsion  Duration:  Timing: x1 at 15yo, x1 at 17yo  Modifying factors: triggered by flashing light while playing video game  Diurnal Variation: none    Type #2  Severity: myoclonic twitching of mouth/jaw  Onset: early 2021  Quality & associated signs/symptoms: few myoclonic jerking of the jaw in a role  Duration: few seconds  Timing: maybe once a mouth  Modifying factors: triggered by fatigue  Diurnal Variation: none    Type #3  Severity: zoning out  Onset: early 2021  Quality & associated signs/symptoms: zoning out, immediately back to baseline  Duration: few seconds  Timing: once every few months  Modifying factors: triggered by dehydration or fatigue   Diurnal Variation: none    SEIZURE RISK FACTORS:  Sister has epilepsy, onset 20s, characterized by focal aware, focal impaired awareness and focal to bilateral tonic clonic sz's. No history of febrile seizure, TBI, CNS infection.    CURRENT AED:  none    PREVIOUS AED:  LTG - for bipolar, rash    IMAGING:   no MRI brain    NEUROPHYSIOLOGY:  none    NEUROPSYCHOLOGY:   none    PMH:  bipolar disorder    PSH:  drainage of hydrocele    HOME MEDICATIONS:     ALLERGIES:   Lamictal     FAMILY HISTORY:  Sister has epilepsy, onset 20s, characterized by focal aware, focal impaired awareness and focal to bilateral tonic clonic sz's.    SOCIAL HISTORY:   No E/T/D. Has license but not driving. Not working. Residing at [facility], [facility].    ROS:  Negative for constitutional, skin, eyes, ENT, respiratory, cardiovascular, gastrointestinal, genitourinary, musculoskeletal, neurologic, psychiatric, hematology/lymphatics, endocrine, allergic/immunologic.      VITALS:  T(C): --  HR: --  BP: --  ABP: --  RR: --  SpO2: --  CVP(cm H2O): --    GENERAL PHYSICAL EXAM:  GEN: no distress  HEENT: NCAT, OP clear  EYES: sclera white, conjunctiva clear, no nystagmus  NECK: supple  CV: RRR, no murmur     		  PULM: CTAB, no wheezing  ABD: soft ABD, +BS, NT  EXT: peripheral pulse intact, no cyanosis  MSK: muscle tone and strength normal  SKIN: warm, dry    NEUROLOGICAL EXAM:  Mental Status  Orientation: alert and oriented to person, place, time, and situation   Language: clear and fluent    Cranial Nerves  II: full visual fields intact   III, IV, VI: PERRL, EOMI  V, VII: facial sensation and movement intact and symmetric   VIII: hearing intact   IX, X: uvula midline, soft palate elevates normally   XI: BL shoulder shrug intact   XII: tongue midline    Motor  5/5 BUE and BLE                 Tone and bulk are normal in upper and lower limbs  No pronator drift    Sensation  Intact to light touch and pinprick in all 4 EXTs    Reflex  2+ in BL biceps and patella                                   Plantar responses downward bilaterally    Coordination  Normal FTN bilaterally    Gait  Deferred      LABS:   pending                                                                                          [facility]                                                                     MD Mere Richmond DO                                              Epilepsy Consult #: [phone] ([phone])                                               Office: [address] [place] [address], [address]                                                 Phone: [phone]; Fax: [phone]                            ==============================================    EPILEPSY INITIAL CONSULT NOTE      HPI  This is a 26 year-old LH male with a history of depression and anxiety who presents to EMU to differentiate epileptic from nonepileptic events. Patient has had 2 BTC sz’s (type #1) in teenage and early adulthood. Then earlier this year, began to have myoclonic twitching of the jaw (type #2) and zoning out episodes (type #3).    SEIZURE/SPELL DESCRIPTION AND TYPE:  Type #1  Severity: BTC sz  Onset: 13yo  Quality & associated signs/symptoms: no aura -> LOC, full body convulsion  Duration:  Timing: x1 at 13yo, x1 at 15yo  Modifying factors: triggered by flashing light while playing video game  Diurnal Variation: none    Type #2  Severity: myoclonic twitching of mouth/jaw  Onset: early 2021  Quality & associated signs/symptoms: few myoclonic jerking of the jaw in a role  Duration: few seconds  Timing: maybe once a mouth  Modifying factors: triggered by fatigue  Diurnal Variation: none    Type #3  Severity: zoning out  Onset: early 2021  Quality & associated signs/symptoms: zoning out, immediately back to baseline  Duration: few seconds  Timing: once every few months  Modifying factors: triggered by dehydration or fatigue   Diurnal Variation: none    SEIZURE RISK FACTORS:  Sister has epilepsy, onset 20s, characterized by focal aware, focal impaired awareness and focal to bilateral tonic clonic sz's. No history of febrile seizure, TBI, CNS infection.    CURRENT AED:  none    PREVIOUS AED:  LTG - for bipolar, rash    IMAGING:   no MRI brain    NEUROPHYSIOLOGY:  none    NEUROPSYCHOLOGY:   none    PMH:  bipolar disorder    PSH:  drainage of hydrocele    MEDICATIONS  (STANDING):  buPROPion XL (24-Hour) . 300 milliGRAM(s) Oral daily  cholecalciferol 2000 Unit(s) Oral daily  cloZAPine 225 milliGRAM(s) Oral at bedtime  enoxaparin Injectable 40 milliGRAM(s) SubCutaneous daily  influenza   Vaccine 0.5 milliLiter(s) IntraMuscular once  metoprolol tartrate 25 milliGRAM(s) Oral two times a day  senna 2 Tablet(s) Oral at bedtime    ALLERGIES:   Lamictal     FAMILY HISTORY:  Sister has epilepsy, onset 20s, characterized by focal aware, focal impaired awareness and focal to bilateral tonic clonic sz's.    SOCIAL HISTORY:   No E/T/D. Has license but not driving. Not working. Residing at [facility].    ROS:  Negative for constitutional, skin, eyes, ENT, respiratory, cardiovascular, gastrointestinal, genitourinary, musculoskeletal, neurologic, psychiatric, hematology/lymphatics, endocrine, allergic/immunologic.    Vital Signs Last 24 Hrs  T(C): 36.6 (06 Oct 2021 14:21), Max: 36.6 (06 Oct 2021 14:21)  T(F): 97.9 (06 Oct 2021 14:21), Max: 97.9 (06 Oct 2021 14:21)  HR: 69 (06 Oct 2021 14:21) (69 - 69)  BP: 117/80 (06 Oct 2021 14:21) (117/80 - 117/80)  BP(mean): --  RR: 18 (06 Oct 2021 14:21) (18 - 18)  SpO2: 98% (06 Oct 2021 14:21) (98% - 98%)    GENERAL PHYSICAL EXAM:  GEN: no distress  HEENT: NCAT, OP clear  EYES: sclera white, conjunctiva clear, no nystagmus  NECK: supple  CV: RRR, no murmur     		  PULM: CTAB, no wheezing  ABD: soft ABD, +BS, NT  EXT: peripheral pulse intact, no cyanosis  MSK: muscle tone and strength normal  SKIN: warm, dry    NEUROLOGICAL EXAM:  Mental Status  Orientation: alert and oriented to person, place, time, and situation   Language: clear and fluent    Cranial Nerves  II: full visual fields intact   III, IV, VI: PERRL, EOMI  V, VII: facial sensation and movement intact and symmetric   VIII: hearing intact   IX, X: uvula midline, soft palate elevates normally   XI: BL shoulder shrug intact   XII: tongue midline    Motor  5/5 BUE and BLE                 Tone and bulk are normal in upper and lower limbs  No pronator drift    Sensation  Intact to light touch and pinprick in all 4 EXTs    Reflex  2+ in BL biceps and patella                                   Plantar responses downward bilaterally    Coordination  Normal FTN bilaterally    Gait  Deferred      LABS:   pending

## 2021-10-06 NOTE — H&P ADULT - ASSESSMENT
26 year-old  male with a history of HTN, Bipolar, constipation presents to EMU to differentiate epileptic from nonepileptic events.    # To Classify Sz  - cvEEG  - continue all home meds  - tele monitor  - bed rail up at all times  - bed rail padding  - OOB only with supervision and assist    # HTN  - Cont Metoprolol (?taking tartrate QD?)    # Bipolar  - Cont Clozapine and Bupropion    Lovenox

## 2021-10-07 ENCOUNTER — APPOINTMENT (OUTPATIENT)
Dept: NEUROLOGY | Facility: CLINIC | Age: 26
End: 2021-10-07

## 2021-10-07 LAB
COVID-19 SPIKE DOMAIN AB INTERP: POSITIVE
COVID-19 SPIKE DOMAIN ANTIBODY RESULT: >250 U/ML — HIGH
SARS-COV-2 IGG+IGM SERPL QL IA: >250 U/ML — HIGH
SARS-COV-2 IGG+IGM SERPL QL IA: POSITIVE

## 2021-10-07 PROCEDURE — 99232 SBSQ HOSP IP/OBS MODERATE 35: CPT

## 2021-10-07 PROCEDURE — 99233 SBSQ HOSP IP/OBS HIGH 50: CPT

## 2021-10-07 RX ORDER — DIVALPROEX SODIUM 500 MG/1
500 TABLET, DELAYED RELEASE ORAL
Refills: 0 | Status: DISCONTINUED | OUTPATIENT
Start: 2021-10-08 | End: 2021-10-08

## 2021-10-07 RX ORDER — VALPROIC ACID (AS SODIUM SALT) 250 MG/5ML
2000 SOLUTION, ORAL ORAL ONCE
Refills: 0 | Status: COMPLETED | OUTPATIENT
Start: 2021-10-07 | End: 2021-10-07

## 2021-10-07 RX ADMIN — CLOZAPINE 225 MILLIGRAM(S): 150 TABLET, ORALLY DISINTEGRATING ORAL at 22:01

## 2021-10-07 RX ADMIN — SENNA PLUS 2 TABLET(S): 8.6 TABLET ORAL at 22:01

## 2021-10-07 RX ADMIN — Medication 2000 UNIT(S): at 12:54

## 2021-10-07 RX ADMIN — Medication 35 MILLIGRAM(S): at 18:11

## 2021-10-07 RX ADMIN — Medication 25 MILLIGRAM(S): at 18:11

## 2021-10-07 RX ADMIN — ENOXAPARIN SODIUM 40 MILLIGRAM(S): 100 INJECTION SUBCUTANEOUS at 12:54

## 2021-10-07 RX ADMIN — Medication 25 MILLIGRAM(S): at 06:42

## 2021-10-07 RX ADMIN — BUPROPION HYDROCHLORIDE 300 MILLIGRAM(S): 150 TABLET, EXTENDED RELEASE ORAL at 12:54

## 2021-10-07 NOTE — PROGRESS NOTE ADULT - ASSESSMENT
26 year-old  male with a history of HTN, Bipolar, constipation presents to EMU to differentiate epileptic from nonepileptic events.    # To Classify Sz  - cvEEG- Interictal findings suggest generalized epilepsy.  - continue all home meds  - tele monitor  - bed rail up at all times  - bed rail padding  - OOB only with supervision and assist    # HTN  - Cont Metoprolol (?taking tartrate QD?)    # Bipolar  - Cont Clozapine and Bupropion    Lovenox

## 2021-10-07 NOTE — PROGRESS NOTE ADULT - ASSESSMENT
26 year-old  male with a history of depression and anxiety who presents to EMU to differentiate epileptic from nonepileptic events. Personally reviewed all imagines, labs, EEG and other studies.    EEG consistent with generalized epilepsy.    Recommendation:  - cvEEG  - start valproic acid: 2000mg IV tonight -> 500mg BID stating tomorrow (ordered)   - tele monitor  - bed rail up at all times  - bed rail padding  - OOB only with supervision and assist  - seizure precaution  - anticipate discharge home tomorrow on: divalproex sodium DR 500mg BID   - follow-up with me in clinic: [facility] ([phone]), [address] [place] [address]       Will continue to follow with you.   ______________________  Gold Noel MD   Director, Epilepsy/EMU - [facility]   Epilepsy Consult #: [phone] ([phone])

## 2021-10-07 NOTE — EEG REPORT - NS EEG TEXT BOX
[facility] Epilepsy Monitoring Unit Report  [facility]: [address] [place] [address], Phone [phone] [facility]: [address] [place] [address], Phone [phone] [place] [facility]: [address], [address], [place] [address] Phone [phone]  [facility]: [address] [place] [address], Phone [phone] [place] [facility]: [address] [place] [address], Phone [phone]  Patient Name: Milton Low   Age: 26 year, : 1995 Patient ID: -, MRN #: -, Patel: -  Physician Ordering Inpatient EEG: Dr. Winsome Haque Referral Source to EMU: elective admission – Dr. Noel  EMU Study Started: 17:52 on 10/6/21   EMU Study Ended: pending discharge  Study Information:  EEG Recording Technique: The patient underwent continuous Video-EEG monitoring, using Telemetry System hardware on the XLTek Digital System. EEG and video data were stored on a computer hard drive with important events saved in digital archive files. The material was reviewed by a physician (electroencephalographer / epileptologist) on a daily basis. Christian and seizure detection algorithms were utilized and reviewed. An EEG Technician attended to the patient, and was available throughout daytime work hours.  The epilepsy center neurologist was available in person or on call 24-hours per day.  EEG Placement and Labeling of Electrodes: The EEG was performed utilizing 20 channel referential EEG connections (coronal over temporal over parasagittal montage) using all standard 10-20 electrode placements with EKG, with additional electrodes placed in the inferior temporal region using the modified 10-10 montage electrode placements for elective admissions, or if deemed necessary. Recording was at a sampling rate of 256 samples per second per channel. Time synchronized digital video recording was done simultaneously with EEG recording. A low light infrared camera was used for low light recording.     History: This is a 26 year-old  male with a history of depression and anxiety who presents to EMU to differentiate epileptic from nonepileptic events. Patient has had 2 BTC sz’s (type #1) in teenage and early adulthood. Then earlier this year, began to have myoclonic twitching of the jaw (type #2) and zoning out episodes (type #3).  SEIZURE/SPELL DESCRIPTION AND TYPE: Type #1 Severity: BTC sz Onset: 13yo Quality & associated signs/symptoms: no aura -> LOC, full body convulsion Duration: Timing: x1 at 13yo, x1 at 17yo Modifying factors: triggered by flashing light while playing video game Diurnal Variation: none  Type #2 Severity: myoclonic twitching of mouth/jaw Onset: early  Quality & associated signs/symptoms: few myoclonic jerking of the jaw in a role Duration: few seconds Timing: maybe once a mouth Modifying factors: triggered by fatigue Diurnal Variation: none  Type #3 Severity: zoning out Onset: early  Quality & associated signs/symptoms: zoning out, immediately back to baseline Duration: few seconds Timing: once every few months Modifying factors: triggered by dehydration or fatigue  Diurnal Variation: none  SEIZURE RISK FACTORS: Sister has epilepsy, onset 20s, characterized by focal aware, focal impaired awareness and focal to bilateral tonic clonic sz's. No history of febrile seizure, TBI, CNS infection.  CURRENT AED: none  PREVIOUS AED: LTG - for bipolar, rash  IMAGING:  no MRI brain  NEUROPHYSIOLOGY: none  NEUROPSYCHOLOGY:  none  Home Antiepileptic Medication and Device none  Interpretation:  Start Date: 10/6/2021 – Day 1                                Start Time – 17:52       Duration – 14h 04m  Daily EEG Visual Analysis Findings: The background was continuous and reactive. During wakefulness, the posterior dominant rhythm consisted of symmetric, well-modulated 10 Hz activity, with amplitude to 30 uV, that attenuated to eye opening.   Background Slowing: Excess diffuse polymorphic delta slowing.  Focal Slowing:  None were present.  Sleep Background: Drowsiness was characterized by fragmentation, attenuation, and slowing of the background activity.   Sleep was characterized by the presence of vertex waves, symmetric sleep spindles and K-complexes. Presence of dyshormia.  Other Non-Epileptiform Findings: Photic stimulation elicited photoparoxysmal response with bioccipital spike-wave discharges, most robust at 18 Hz.  BP, 7uV    Interictal Epileptiform Activity:  Occasional bursts of generalized spike-wave discharges and fragmented discharged, no longer than 1s in duration, mostly in wakefulness.  BP, 7uV    Events: No events or seizures recorded.  Artifacts: Intermittent myogenic and movement artifacts were noted.  ECG: The heart rate on single channel ECG was predominantly between 80-90 BPM.  AED: none  EEG Summary: Abnormal EEG in the awake, drowsy and asleep states. - Occasional bursts of generalized spike-wave discharges and fragmented discharged, no longer than 1s in duration, mostly in wakefulness. - Mild generalized slowing. - Photic stimulation elicited photoparoxysmal response with bioccipital spike-wave discharges, most robust at 18 Hz.  Impression/Clinical Correlate: 10/6 – 10/7: No events or seizures were recorded.  Interictal findings suggest generalized epilepsy.  ________________________________________  Gold Noel MD Director, Epilepsy/EMU - [facility]

## 2021-10-08 ENCOUNTER — TRANSCRIPTION ENCOUNTER (OUTPATIENT)
Age: 26
End: 2021-10-08

## 2021-10-08 VITALS
HEART RATE: 81 BPM | OXYGEN SATURATION: 97 % | RESPIRATION RATE: 18 BRPM | SYSTOLIC BLOOD PRESSURE: 123 MMHG | DIASTOLIC BLOOD PRESSURE: 80 MMHG | TEMPERATURE: 98 F

## 2021-10-08 PROCEDURE — 99239 HOSP IP/OBS DSCHRG MGMT >30: CPT

## 2021-10-08 PROCEDURE — 99233 SBSQ HOSP IP/OBS HIGH 50: CPT

## 2021-10-08 RX ORDER — DIVALPROEX SODIUM 500 MG/1
1 TABLET, DELAYED RELEASE ORAL
Qty: 0 | Refills: 0 | DISCHARGE
Start: 2021-10-08

## 2021-10-08 RX ADMIN — ENOXAPARIN SODIUM 40 MILLIGRAM(S): 100 INJECTION SUBCUTANEOUS at 11:26

## 2021-10-08 RX ADMIN — Medication 2000 UNIT(S): at 11:24

## 2021-10-08 RX ADMIN — Medication 25 MILLIGRAM(S): at 06:49

## 2021-10-08 RX ADMIN — DIVALPROEX SODIUM 500 MILLIGRAM(S): 500 TABLET, DELAYED RELEASE ORAL at 06:47

## 2021-10-08 RX ADMIN — BUPROPION HYDROCHLORIDE 300 MILLIGRAM(S): 150 TABLET, EXTENDED RELEASE ORAL at 11:23

## 2021-10-08 NOTE — DISCHARGE NOTE PROVIDER - NSDCPNSUBOBJ_GEN_ALL_CORE
pt seen and examined  comfortable  no overt sz  interictal findings    Vital Signs Last 24 Hrs  T(C): 36.3 (10-08-21 @ 05:19), Max: 37.1 (10-07-21 @ 17:09)  T(F): 97.4 (10-08-21 @ 05:19), Max: 98.7 (10-07-21 @ 17:09)  HR: 78 (10-08-21 @ 05:19) (78 - 91)  BP: 105/62 (10-08-21 @ 05:19) (101/69 - 115/75)  BP(mean): --  RR: 18 (10-08-21 @ 05:19) (16 - 19)  SpO2: 94% (10-08-21 @ 05:19) (94% - 97%)    GENERAL: Comfortable, unable to give clear history/ medications, offers no complaints  HEAD:  Atraumatic, Normocephalic  EYES: EOMI, conjunctiva and sclera clear  NECK: Supple, No JVD, Normal thyroid  NERVOUS SYSTEM:  Alert & Oriented X 3, Motor Strength 5/5 B/L upper and lower extremities  CHEST/LUNG: CTA bilaterally; No rales, rhonchi, wheezing, or rubs  HEART: Regular rate and rhythm; No murmurs, rubs, or gallops  ABDOMEN: Soft, Nontender, Nondistended; Bowel sounds present  EXTREMITIES:  2+ Peripheral Pulses, No clubbing, cyanosis, or edema  SKIN: No rashes or lesions    · Assessment	  26 year-old  male with a history of HTN, Bipolar, constipation presents to EMU to differentiate epileptic from nonepileptic events.    # To Classify Sz  - cvEEG- Interictal findings suggest generalized epilepsy.  - continue all home meds  - tele monitor  - bed rail up at all times  - bed rail padding  - OOB only with supervision and assist    # HTN  - Cont Metoprolol (?taking tartrate QD?)    # Bipolar  - Cont Clozapine and Bupropion    discharge back today

## 2021-10-08 NOTE — PROGRESS NOTE ADULT - SUBJECTIVE AND OBJECTIVE BOX
HEALTH ISSUES - PROBLEM Dx:    to differentiate epileptic from nonepileptic events    INTERVAL HPI/ OVERNIGHT EVENTS:    comfortable  no events noted overnight  offers no complaints    REVIEW OF SYSTEMS:    as above    Vital Signs Last 24 Hrs  T(C): 36.4 (07 Oct 2021 04:25), Max: 36.6 (06 Oct 2021 14:21)  T(F): 97.6 (07 Oct 2021 04:25), Max: 97.9 (06 Oct 2021 14:21)  HR: 79 (07 Oct 2021 04:25) (69 - 79)  BP: 111/70 (07 Oct 2021 04:25) (111/70 - 117/80)  BP(mean): --  RR: 18 (07 Oct 2021 04:25) (18 - 18)  SpO2: 95% (07 Oct 2021 09:02) (94% - 99%)    PHYSICAL EXAM-  GENERAL: Comfortable, unable to give clear history/ medications, offers no complaints  HEAD:  Atraumatic, Normocephalic  EYES: EOMI, conjunctiva and sclera clear  NECK: Supple, No JVD, Normal thyroid  NERVOUS SYSTEM:  Alert & Oriented X 3, Motor Strength 5/5 B/L upper and lower extremities  CHEST/LUNG: CTA bilaterally; No rales, rhonchi, wheezing, or rubs  HEART: Regular rate and rhythm; No murmurs, rubs, or gallops  ABDOMEN: Soft, Nontender, Nondistended; Bowel sounds present  EXTREMITIES:  2+ Peripheral Pulses, No clubbing, cyanosis, or edema  SKIN: No rashes or lesions    MEDICATIONS  (STANDING):  buPROPion XL (24-Hour) . 300 milliGRAM(s) Oral daily  cholecalciferol 2000 Unit(s) Oral daily  cloZAPine 225 milliGRAM(s) Oral at bedtime  enoxaparin Injectable 40 milliGRAM(s) SubCutaneous daily  influenza   Vaccine 0.5 milliLiter(s) IntraMuscular once  metoprolol tartrate 25 milliGRAM(s) Oral two times a day  senna 2 Tablet(s) Oral at bedtime    MEDICATIONS  (PRN):  bisacodyl 5 milliGRAM(s) Oral every 12 hours PRN Constipation  magnesium citrate Oral Solution 1 Bottle Oral once PRN Constipation      LABS:                        14.7   6.61  )-----------( 170      ( 06 Oct 2021 17:16 )             41.1     10-06    141  |  107  |  11.4  ----------------------------<  83  4.5   |  24.0  |  1.08    Ca    9.9      06 Oct 2021 17:16  Phos  3.8     10-06  Mg     1.9     10-06    TPro  6.9  /  Alb  4.3  /  TBili  0.4  /  DBili  x   /  AST  19  /  ALT  17  /  AlkPhos  69  10-06    PT/INR - ( 06 Oct 2021 17:16 )   PT: 12.5 sec;   INR: 1.08 ratio       
                                                                     [facility]                                                                     MD Mere Richmond DO                                              Epilepsy Consult #: 83-EPILEPSY ([phone])                                               Office: [address] [place] [address], [address]                                                 Phone: [phone]; Fax: [phone]                            ==============================================    EPILEPSY FOLLOW-UP NOTE      INTERVAL HISTORY:  Started on valproic acid last night. No seizure overnight.    MEDICATIONS  (STANDING):  buPROPion XL (24-Hour) . 300 milliGRAM(s) Oral daily  cholecalciferol 2000 Unit(s) Oral daily  cloZAPine 225 milliGRAM(s) Oral at bedtime  diVALproex  milliGRAM(s) Oral two times a day  enoxaparin Injectable 40 milliGRAM(s) SubCutaneous daily  influenza   Vaccine 0.5 milliLiter(s) IntraMuscular once  metoprolol tartrate 25 milliGRAM(s) Oral two times a day  senna 2 Tablet(s) Oral at bedtime    Vital Signs Last 24 Hrs  T(C): 36.3 (08 Oct 2021 05:19), Max: 37.1 (07 Oct 2021 17:09)  T(F): 97.4 (08 Oct 2021 05:19), Max: 98.7 (07 Oct 2021 17:09)  HR: 78 (08 Oct 2021 05:19) (78 - 91)  BP: 105/62 (08 Oct 2021 05:19) (101/69 - 115/75)  BP(mean): --  RR: 18 (08 Oct 2021 05:19) (16 - 19)  SpO2: 94% (08 Oct 2021 05:19) (94% - 97%)    GENERAL PHYSICAL EXAM:  GEN: no distress   HEENT: NCAT, OP clear  EYES: sclera white, conjunctiva clear, no nystagmus  NECK: supple  CV: RRR, no murmur     		  PULM: CTAB, no wheezing  ABD: soft, +BS, NT  EXT: peripheral pulse intact, no cyanosis  MSK: muscle tone and strength normal  SKIN: warm, dry    NEUROLOGICAL EXAM:  Mental Status  Orientation: alert and oriented to person, place, time, and situation   Language: clear and fluent    Cranial Nerves  II: full visual fields intact   III, IV, VI: PERRL, EOMI  V, VII: facial sensation and movement intact and symmetric   VIII: hearing intact   IX, X: uvula midline, soft palate elevates normally   XI: BL shoulder shrug intact   XII: tongue midline    Motor  5/5 BUE and BLE                 Tone and bulk are normal in upper and lower limbs  No pronator drift    Sensation  Intact to light touch and pinprick in all 4 EXTs    Reflex  2+ in BL biceps and patella                                    Plantar responses downward bilaterally    Coordination  Normal FTN bilaterally    Gait  Deferred       LABS:                          14.7   6.61  )-----------( 170      ( 06 Oct 2021 17:16 )             41.1     10-06    141  |  107  |  11.4  ----------------------------<  83  4.5   |  24.0  |  1.08    Ca    9.9      06 Oct 2021 17:16  Phos  3.8     10-06  Mg     1.9     10-06    TPro  6.9  /  Alb  4.3  /  TBili  0.4  /  DBili  x   /  AST  19  /  ALT  17  /  AlkPhos  69  10-06    CAPILLARY BLOOD GLUCOSE        LIVER FUNCTIONS - ( 06 Oct 2021 17:16 )  Alb: 4.3 g/dL / Pro: 6.9 g/dL / ALK PHOS: 69 U/L / ALT: 17 U/L / AST: 19 U/L / GGT: x           PT/INR - ( 06 Oct 2021 17:16 )   PT: 12.5 sec;   INR: 1.08 ratio               OTHER IMAGING AND STUDIES:    EMU 10/6 - 10/8/21:  EEG Summary:  Abnormal EEG in the awake, drowsy and asleep states.  - Occasional bursts of 2-3 Hz generalized spike-wave discharges and fragmented discharged, no longer than 1s in duration, mostly in wakefulness.  - Mild generalized slowing.  - Photic stimulation elicited photoparoxysmal response with bioccipital spike-wave discharges, most robust at 18 Hz.    Impression/Clinical Correlate:  10/6 – 10/7: No events or seizures were recorded.  10/7 – 10/8: No events or seizures were recorded.    During this EMU admission, no events or seizures were recorded. Interictal findings suggest generalized epilepsy.    ________________________________________    Gold Noel MD  Director, Epilepsy/EMU - [facility]   
                                                                     [facility]                                                                     MD Mere Richmond DO                                              Epilepsy Consult #: 83-EPILEPSY ([phone])                                               Office: [address] [place] [address], [address]                                                 Phone: [phone]; Fax: [phone]                            ==============================================    EPILEPSY FOLLOW-UP NOTE      INTERVAL HISTORY:  No seizure, but generalized spike-wave discharges seen interictally.    MEDICATIONS:   bisacodyl 5 milliGRAM(s) Oral every 12 hours PRN Constipation  buPROPion XL (24-Hour) . 300 milliGRAM(s) Oral daily  cholecalciferol 2000 Unit(s) Oral daily  cloZAPine 225 milliGRAM(s) Oral at bedtime  enoxaparin Injectable 40 milliGRAM(s) SubCutaneous daily  influenza   Vaccine 0.5 milliLiter(s) IntraMuscular once  magnesium citrate Oral Solution 1 Bottle Oral once PRN Constipation  metoprolol tartrate 25 milliGRAM(s) Oral two times a day  senna 2 Tablet(s) Oral at bedtime  valproate sodium IVPB 2000 milliGRAM(s) IV Intermittent once    LAST 24-HR VITALS:  T(C): 36.6 (10-07-21 @ 11:30), Max: 36.6 (10-07-21 @ 11:30)  HR: 78 (10-07-21 @ 11:30) (78 - 79)  BP: 115/75 (10-07-21 @ 11:30) (111/70 - 115/75)  ABP: --  RR: 18 (10-07-21 @ 11:30) (18 - 18)  SpO2: 95% (10-07-21 @ 11:30) (94% - 99%)  CVP(cm H2O): --    GENERAL PHYSICAL EXAM:  GEN: no distress   HEENT: NCAT, OP clear  EYES: sclera white, conjunctiva clear, no nystagmus  NECK: supple  CV: RRR, no murmur     		  PULM: CTAB, no wheezing  ABD: soft, +BS, NT  EXT: peripheral pulse intact, no cyanosis  MSK: muscle tone and strength normal  SKIN: warm, dry    NEUROLOGICAL EXAM:  Mental Status  Orientation: alert and oriented to person, place, time, and situation   Language: clear and fluent    Cranial Nerves  II: full visual fields intact   III, IV, VI: PERRL, EOMI  V, VII: facial sensation and movement intact and symmetric   VIII: hearing intact   IX, X: uvula midline, soft palate elevates normally   XI: BL shoulder shrug intact   XII: tongue midline    Motor  5/5 BUE and BLE                 Tone and bulk are normal in upper and lower limbs  No pronator drift    Sensation  Intact to light touch and pinprick in all 4 EXTs    Reflex  2+ in BL biceps and patella                                    Plantar responses downward bilaterally    Coordination  Normal FTN bilaterally    Gait  Deferred       LABS:                          14.7   6.61  )-----------( 170      ( 06 Oct 2021 17:16 )             41.1     10-06    141  |  107  |  11.4  ----------------------------<  83  4.5   |  24.0  |  1.08    Ca    9.9      06 Oct 2021 17:16  Phos  3.8     10-06  Mg     1.9     10-06    TPro  6.9  /  Alb  4.3  /  TBili  0.4  /  DBili  x   /  AST  19  /  ALT  17  /  AlkPhos  69  10-06    CAPILLARY BLOOD GLUCOSE        LIVER FUNCTIONS - ( 06 Oct 2021 17:16 )  Alb: 4.3 g/dL / Pro: 6.9 g/dL / ALK PHOS: 69 U/L / ALT: 17 U/L / AST: 19 U/L / GGT: x           PT/INR - ( 06 Oct 2021 17:16 )   PT: 12.5 sec;   INR: 1.08 ratio               OTHER IMAGING AND STUDIES:    EMU 10/6 - 10/7/21:  EEG Summary:  Abnormal EEG in the awake, drowsy and asleep states.  - Occasional bursts of generalized spike-wave discharges and fragmented discharged, no longer than 1s in duration, mostly in wakefulness.  - Mild generalized slowing.  - Photic stimulation elicited photoparoxysmal response with bioccipital spike-wave discharges, most robust at 18 Hz.    Impression/Clinical Correlate:  10/6 – 10/7: No events or seizures were recorded.    Interictal findings suggest generalized epilepsy.

## 2021-10-08 NOTE — DISCHARGE NOTE PROVIDER - HOSPITAL COURSE
admitted to classify Sz.  interictal findings of gen epilepsy noted  depakote started  Medically stable and agreeable with discharge and follow up plan. Patient was advised to return to ED if any symptoms occur or worsen.  time 48 mins

## 2021-10-08 NOTE — DISCHARGE NOTE PROVIDER - CARE PROVIDER_API CALL
Gold Noel)  EEGEpilepsy; Neurology  [address] [place] [address]  Phone: [phone]  Fax: [phone]  Follow Up Time:

## 2021-10-08 NOTE — DISCHARGE NOTE PROVIDER - NSDCMRMEDTOKEN_GEN_ALL_CORE_FT
Bisco-Lax 5 mg oral delayed release tablet: 1 tab(s) orally once a day  buPROPion 300 mg/24 hours (XL) oral tablet, extended release: 1 tab(s) orally once a day  cloZAPine: 225 milligram(s) orally once a day (at bedtime)  Colace 100 mg oral capsule: 1 cap(s) orally 2 times a day   divalproex sodium 500 mg oral delayed release tablet: 1 tab(s) orally 2 times a day  magnesium citrate 1.745 g/30 mL oral liquid: 300 milliliter(s) orally 3 to 4 times a week, As Needed  Metoprolol Tartrate 25 mg oral tablet: 1 tab(s) orally 2 times a day  senna 8.6 mg oral tablet: 1 tab(s) orally once a day   Vitamin D2 50 mcg (2000 intl units) oral capsule: 1 cap(s) orally once a day

## 2021-10-08 NOTE — DISCHARGE NOTE NURSING/CASE MANAGEMENT/SOCIAL WORK - PATIENT PORTAL LINK FT
You can access the FollowMyHealth Patient Portal offered by [facility] by registering at the following website: [email or web address]. By joining [employer]’s FollowMyHealth portal, you will also be able to view your health information using other applications (apps) compatible with our system.

## 2021-10-08 NOTE — EEG REPORT - NS EEG TEXT BOX
[facility] Epilepsy Monitoring Unit Report  [facility]: [address] [place] [address], Phone [phone] [facility]: [address] [place] [address], Phone [phone] [place] Office: [address], [address], [place] [address] Phone [phone]  [facility]: [address] [place] [address], Phone [phone] [place] Office: [address] [place] [address], Phone [phone]  Patient Name: Milton Low   Age: 26 year, : 1995 Patient ID: -, MRN #: -, Patel: -  Physician Ordering Inpatient EEG: Dr. Winsome Haque Referral Source to EMU: elective admission – Dr. Noel  EMU Study Started: 17:52 on 10/6/21   EMU Study Ended: pending discharge  Study Information:  EEG Recording Technique: The patient underwent continuous Video-EEG monitoring, using Telemetry System hardware on the [employer] Digital System. EEG and video data were stored on a computer hard drive with important events saved in digital archive files. The material was reviewed by a physician (electroencephalographer / epileptologist) on a daily basis. Christian and seizure detection algorithms were utilized and reviewed. An EEG Technician attended to the patient, and was available throughout daytime work hours.  The epilepsy center neurologist was available in person or on call 24-hours per day.  EEG Placement and Labeling of Electrodes: The EEG was performed utilizing 20 channel referential EEG connections (coronal over temporal over parasagittal montage) using all standard 10-20 electrode placements with EKG, with additional electrodes placed in the inferior temporal region using the modified 10-10 montage electrode placements for elective admissions, or if deemed necessary. Recording was at a sampling rate of 256 samples per second per channel. Time synchronized digital video recording was done simultaneously with EEG recording. A low light infrared camera was used for low light recording.     History: This is a 26 year-old  male with a history of depression and anxiety who presents to EMU to differentiate epileptic from nonepileptic events. Patient has had 2 BTC sz’s (type #1) in teenage and early adulthood. Then earlier this year, began to have myoclonic twitching of the jaw (type #2) and zoning out episodes (type #3).  SEIZURE/SPELL DESCRIPTION AND TYPE: Type #1 Severity: BTC sz Onset: 15yo Quality & associated signs/symptoms: no aura -> LOC, full body convulsion Duration: Timing: x1 at 15yo, x1 at 15yo Modifying factors: triggered by flashing light while playing video game Diurnal Variation: none  Type #2 Severity: myoclonic twitching of mouth/jaw Onset: early  Quality & associated signs/symptoms: few myoclonic jerking of the jaw in a role Duration: few seconds Timing: maybe once a mouth Modifying factors: triggered by fatigue Diurnal Variation: none  Type #3 Severity: zoning out Onset: early  Quality & associated signs/symptoms: zoning out, immediately back to baseline Duration: few seconds Timing: once every few months Modifying factors: triggered by dehydration or fatigue  Diurnal Variation: none  SEIZURE RISK FACTORS: Sister has epilepsy, onset 20s, characterized by focal aware, focal impaired awareness and focal to bilateral tonic clonic sz's. No history of febrile seizure, TBI, CNS infection.  CURRENT AED: none  PREVIOUS AED: LTG - for bipolar, rash  IMAGING:  no MRI brain  NEUROPHYSIOLOGY: none  NEUROPSYCHOLOGY:  none  Home Antiepileptic Medication and Device none  Interpretation:  Start Date: 10/6/2021 – Day 1                                Start Time – 17:52       Duration – 14h 04m  Daily EEG Visual Analysis Findings: The background was continuous and reactive. During wakefulness, the posterior dominant rhythm consisted of symmetric, well-modulated 10 Hz activity, with amplitude to 30 uV, that attenuated to eye opening.   Background Slowing: Excess diffuse polymorphic delta slowing.  Focal Slowing:  None were present.  Sleep Background: Drowsiness was characterized by fragmentation, attenuation, and slowing of the background activity.   Sleep was characterized by the presence of vertex waves, symmetric sleep spindles and K-complexes. Presence of dyshormia.  Other Non-Epileptiform Findings: Photic stimulation elicited photoparoxysmal response with bioccipital spike-wave discharges, most robust at 18 Hz.  BP, 7uV    Interictal Epileptiform Activity:  Occasional bursts of generalized spike-wave discharges and fragmented discharged, no longer than 1s in duration, mostly in wakefulness.  BP, 7uV    Events: No events or seizures recorded.  Artifacts: Intermittent myogenic and movement artifacts were noted.  ECG: The heart rate on single channel ECG was predominantly between 80-90 BPM.  AED: None  Start Date: 10/7/2021 – Day 2                                       Start Time – 08:00      Duration – 24h  Daily EEG Visual Analysis FINDINGS:  The background, artifacts and HR on single channel ECG were similar as previous day.  Interictal Epileptiform Activity:  Occasional bursts of 2-3 Hz generalized spike-wave discharges and fragmented discharged, no longer than 1s in duration, mostly in wakefulness.  BP, 7uV     Events: No events or seizures recorded.  AED: VPA 2000mg PM  EEG Summary: Abnormal EEG in the awake, drowsy and asleep states. - Occasional bursts of 2-3 Hz generalized spike-wave discharges and fragmented discharged, no longer than 1s in duration, mostly in wakefulness. - Mild generalized slowing. - Photic stimulation elicited photoparoxysmal response with bioccipital spike-wave discharges, most robust at 18 Hz.  Impression/Clinical Correlate: 10/6 – 10/7: No events or seizures were recorded.  During this EMU admission, no events or seizures were recorded. Interictal findings suggest generalized epilepsy.  ________________________________________  Gold Noel MD Director, Epilepsy/EMU - [facility]          [facility]psy [facility] Epilepsy Monitoring Unit Report  [facility]: [address] [place] [address], Phone [phone] [facility]: [address] [place] [address], Phone [phone] [place] Office: [address], [address], [place] [address] Phone [phone]  [facility]: [address] [place] [address], Phone [phone] [place] Office: [address] [place] [address], Phone [phone]  Patient Name: Milton Low   Age: 26 year, : 1995 Patient ID: -, MRN #: -, Patel: -  Physician Ordering Inpatient EEG: Dr. Winsome Haque Referral Source to EMU: elective admission – Dr. Noel  EMU Study Started: 17:52 on 10/6/21   EMU Study Ended: pending discharge  Study Information:  EEG Recording Technique: The patient underwent continuous Video-EEG monitoring, using Telemetry System hardware on the [employer] Digital System. EEG and video data were stored on a computer hard drive with important events saved in digital archive files. The material was reviewed by a physician (electroencephalographer / epileptologist) on a daily basis. Chirstian and seizure detection algorithms were utilized and reviewed. An EEG Technician attended to the patient, and was available throughout daytime work hours.  The epilepsy center neurologist was available in person or on call 24-hours per day.  EEG Placement and Labeling of Electrodes: The EEG was performed utilizing 20 channel referential EEG connections (coronal over temporal over parasagittal montage) using all standard 10-20 electrode placements with EKG, with additional electrodes placed in the inferior temporal region using the modified 10-10 montage electrode placements for elective admissions, or if deemed necessary. Recording was at a sampling rate of 256 samples per second per channel. Time synchronized digital video recording was done simultaneously with EEG recording. A low light infrared camera was used for low light recording.     History: This is a 26 year-old  male with a history of depression and anxiety who presents to EMU to differentiate epileptic from nonepileptic events. Patient has had 2 BTC sz’s (type #1) in teenage and early adulthood. Then earlier this year, began to have myoclonic twitching of the jaw (type #2) and zoning out episodes (type #3).  SEIZURE/SPELL DESCRIPTION AND TYPE: Type #1 Severity: BTC sz Onset: 13yo Quality & associated signs/symptoms: no aura -> LOC, full body convulsion Duration: Timing: x1 at 13yo, x1 at 17yo Modifying factors: triggered by flashing light while playing video game Diurnal Variation: none  Type #2 Severity: myoclonic twitching of mouth/jaw Onset: early  Quality & associated signs/symptoms: few myoclonic jerking of the jaw in a role Duration: few seconds Timing: maybe once a mouth Modifying factors: triggered by fatigue Diurnal Variation: none  Type #3 Severity: zoning out Onset: early  Quality & associated signs/symptoms: zoning out, immediately back to baseline Duration: few seconds Timing: once every few months Modifying factors: triggered by dehydration or fatigue  Diurnal Variation: none  SEIZURE RISK FACTORS: Sister has epilepsy, onset 20s, characterized by focal aware, focal impaired awareness and focal to bilateral tonic clonic sz's. No history of febrile seizure, TBI, CNS infection.  CURRENT AED: none  PREVIOUS AED: LTG - for bipolar, rash  IMAGING:  no MRI brain  NEUROPHYSIOLOGY: none  NEUROPSYCHOLOGY:  none  Home Antiepileptic Medication and Device none  Interpretation:  Start Date: 10/6/2021 – Day 1                                Start Time – 17:52       Duration – 14h 04m  Daily EEG Visual Analysis Findings: The background was continuous and reactive. During wakefulness, the posterior dominant rhythm consisted of symmetric, well-modulated 10 Hz activity, with amplitude to 30 uV, that attenuated to eye opening.   Background Slowing: Excess diffuse polymorphic delta slowing.  Focal Slowing:  None were present.  Sleep Background: Drowsiness was characterized by fragmentation, attenuation, and slowing of the background activity.   Sleep was characterized by the presence of vertex waves, symmetric sleep spindles and K-complexes. Presence of dyshormia.  Other Non-Epileptiform Findings: Photic stimulation elicited photoparoxysmal response with bioccipital spike-wave discharges, most robust at 18 Hz.  BP, 7uV    Interictal Epileptiform Activity:  Occasional bursts of generalized spike-wave discharges and fragmented discharged, no longer than 1s in duration, mostly in wakefulness.  BP, 7uV    Events: No events or seizures recorded.  Artifacts: Intermittent myogenic and movement artifacts were noted.  ECG: The heart rate on single channel ECG was predominantly between 80-90 BPM.  AED: None  Start Date: 10/7/2021 – Day 2                                       Start Time – 08:00      Duration – 24h  Daily EEG Visual Analysis FINDINGS:  The background, artifacts and HR on single channel ECG were similar as previous day.  Interictal Epileptiform Activity:  Occasional bursts of 2-3 Hz generalized spike-wave discharges and fragmented discharged, no longer than 1s in duration, mostly in wakefulness.  BP, 7uV     Events: No events or seizures recorded.  AED: VPA 2000mg PM  EEG Summary: Abnormal EEG in the awake, drowsy and asleep states. - Occasional bursts of 2-3 Hz generalized spike-wave discharges and fragmented discharged, no longer than 1s in duration, mostly in wakefulness. - Mild generalized slowing. - Photic stimulation elicited photoparoxysmal response with bioccipital spike-wave discharges, most robust at 18 Hz.  Impression/Clinical Correlate: 10/6 – 10/7: No events or seizures were recorded. 10/7 – 10/8: No events or seizures were recorded.  During this EMU admission, no events or seizures were recorded. Interictal findings suggest generalized epilepsy.  ________________________________________  Gold Noel MD Director, Epilepsy/EMU - [facility]

## 2021-10-08 NOTE — PROGRESS NOTE ADULT - ASSESSMENT
26 year-old  male with a history of depression and anxiety who presents to EMU to differentiate epileptic from nonepileptic events. Personally reviewed all imagines, labs, EEG and other studies.    EEG consistent with generalized epilepsy.    Recommendation:  - cvEEG  - start valproic acid: 2000mg IV tonight -> 500mg BID stating tomorrow (ordered)   - tele monitor  - bed rail up at all times  - bed rail padding  - OOB only with supervision and assist  - seizure precaution  - anticipate discharge home tomorrow on: divalproex sodium DR 500mg BID   - follow-up with me in clinic: [facility] [place] ([phone]), [address] [place] [address]       Will continue to follow with you.   ______________________  Gold Noel MD   Director, Epilepsy/EMU - [facility]   Epilepsy Consult #: [phone] ([phone])  26 year-old  male with a history of depression and anxiety who presents to EMU to differentiate epileptic from nonepileptic events. Personally reviewed all imagines, labs, EEG and other studies.    EEG consistent with generalized epilepsy.    Recommendation:  - discontinue cvEEG  - continue divalproex sodium DR 500mg BID   - tele monitor  - bed rail up at all times  - bed rail padding  - OOB only with supervision and assist  - seizure precaution  - anticipate discharge home today on: divalproex sodium DR 500mg BID (script sent to patient's local pharmacy)   - follow-up with me in clinic: [facility] [place] ([phone]), [place] [address]       No acute intervention from Epilepsy at this time.  Please reconsult if needed.  ______________________  Gold Noel MD   Director, Epilepsy/EMU - [facility]   Epilepsy Consult #: [phone] ([phone])

## 2021-10-08 NOTE — DISCHARGE NOTE PROVIDER - NSDCCPCAREPLAN_GEN_ALL_CORE_FT
PRINCIPAL DISCHARGE DIAGNOSIS  Diagnosis: Generalized epilepsy  Assessment and Plan of Treatment:       SECONDARY DISCHARGE DIAGNOSES  Diagnosis: Bipolar disorder  Assessment and Plan of Treatment:     Diagnosis: HTN (hypertension)  Assessment and Plan of Treatment:

## 2021-10-11 ENCOUNTER — APPOINTMENT (OUTPATIENT)
Dept: MRI IMAGING | Facility: CLINIC | Age: 26
End: 2021-10-11
Payer: MEDICAID

## 2021-10-11 ENCOUNTER — OUTPATIENT (OUTPATIENT)
Dept: OUTPATIENT SERVICES | Facility: HOSPITAL | Age: 26
LOS: 1 days | End: 2021-10-11
Payer: MEDICAID

## 2021-10-11 ENCOUNTER — RESULT REVIEW (OUTPATIENT)
Age: 26
End: 2021-10-11

## 2021-10-11 DIAGNOSIS — R56.9 UNSPECIFIED CONVULSIONS: ICD-10-CM

## 2021-10-11 PROCEDURE — 70551 MRI BRAIN STEM W/O DYE: CPT

## 2021-10-11 PROCEDURE — 70551 MRI BRAIN STEM W/O DYE: CPT | Mod: 26

## 2021-10-20 RX ORDER — MULTIVIT WITH MIN/MFOLATE/K2 340-15/3 G
300 POWDER (GRAM) ORAL
Qty: 0 | Refills: 0 | DISCHARGE

## 2021-10-20 RX ORDER — METOPROLOL TARTRATE 50 MG
1 TABLET ORAL
Qty: 0 | Refills: 0 | DISCHARGE

## 2021-10-20 RX ORDER — ERGOCALCIFEROL 1.25 MG/1
1 CAPSULE ORAL
Qty: 0 | Refills: 0 | DISCHARGE

## 2021-10-20 RX ORDER — BUPROPION HYDROCHLORIDE 150 MG/1
1 TABLET, EXTENDED RELEASE ORAL
Qty: 0 | Refills: 0 | DISCHARGE

## 2021-10-20 RX ORDER — DOCUSATE SODIUM 100 MG
3 CAPSULE ORAL
Qty: 0 | Refills: 0 | DISCHARGE

## 2021-10-20 RX ORDER — CLOZAPINE 150 MG/1
250 TABLET, ORALLY DISINTEGRATING ORAL
Qty: 0 | Refills: 0 | DISCHARGE

## 2021-10-20 RX ORDER — CLOZAPINE 150 MG/1
225 TABLET, ORALLY DISINTEGRATING ORAL
Qty: 0 | Refills: 0 | DISCHARGE

## 2021-10-20 RX ORDER — LITHIUM CARBONATE 300 MG/1
1 TABLET, EXTENDED RELEASE ORAL
Qty: 0 | Refills: 0 | DISCHARGE

## 2021-10-20 RX ORDER — CIPROFLOXACIN HCL 0.3 %
2 DROPS OPHTHALMIC (EYE)
Qty: 0 | Refills: 0 | DISCHARGE

## 2021-10-20 RX ORDER — CLOZAPINE 150 MG/1
25 TABLET, ORALLY DISINTEGRATING ORAL
Qty: 0 | Refills: 0 | DISCHARGE

## 2021-10-20 RX ORDER — SENNA PLUS 8.6 MG/1
1 TABLET ORAL
Qty: 0 | Refills: 0 | DISCHARGE

## 2021-11-04 ENCOUNTER — APPOINTMENT (OUTPATIENT)
Dept: DERMATOLOGY | Facility: CLINIC | Age: 26
End: 2021-11-04

## 2022-01-06 PROCEDURE — 86769 SARS-COV-2 COVID-19 ANTIBODY: CPT

## 2022-01-06 PROCEDURE — 83735 ASSAY OF MAGNESIUM: CPT

## 2022-01-06 PROCEDURE — 36415 COLL VENOUS BLD VENIPUNCTURE: CPT

## 2022-01-06 PROCEDURE — 84100 ASSAY OF PHOSPHORUS: CPT

## 2022-01-06 PROCEDURE — 85610 PROTHROMBIN TIME: CPT

## 2022-01-06 PROCEDURE — 95700 EEG CONT REC W/VID EEG TECH: CPT

## 2022-01-06 PROCEDURE — 85025 COMPLETE CBC W/AUTO DIFF WBC: CPT

## 2022-01-06 PROCEDURE — 95711 VEEG 2-12 HR UNMONITORED: CPT

## 2022-01-06 PROCEDURE — 80053 COMPREHEN METABOLIC PANEL: CPT

## 2022-01-06 PROCEDURE — 95714 VEEG EA 12-26 HR UNMNTR: CPT

## 2022-01-20 PROBLEM — K59.00 CONSTIPATION, UNSPECIFIED: Chronic | Status: ACTIVE | Noted: 2021-04-26

## 2022-01-26 ENCOUNTER — APPOINTMENT (OUTPATIENT)
Dept: NEUROLOGY | Facility: CLINIC | Age: 27
End: 2022-01-26

## 2022-02-09 ENCOUNTER — APPOINTMENT (OUTPATIENT)
Dept: NEUROLOGY | Facility: CLINIC | Age: 27
End: 2022-02-09
Payer: MEDICAID

## 2022-02-09 DIAGNOSIS — Z51.81 ENCOUNTER FOR THERAPEUTIC DRUG LVL MONITORING: ICD-10-CM

## 2022-02-09 PROCEDURE — 99214 OFFICE O/P EST MOD 30 MIN: CPT | Mod: 95

## 2022-02-09 NOTE — CONSULT LETTER
[Dear  ___] : Dear  [unfilled], [Sincerely,] : Sincerely, [FreeTextEntry1] : I had the pleasure of seeing your patient, DEON AGOSTO, in my office today. Please see my note below. \par \par If you have any questions, please do not hesitate to contact me.  [FreeTextEntry3] : Gold Noel MD\par Director, Epilepsy/EMU\par [facility] \par [facility] [place]\par [address] [place] [address] \par Tel: [phone]; Fax: [phone]
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
## 2022-02-09 NOTE — PHYSICAL EXAM
[FreeTextEntry1] : GENERAL PHYSICAL EXAM:\par GEN: no distress, normal affect\par HEENT: NCAT\par CV/PULM/ABD: deferred due to telehealth encounter \par \par NEUROLOGICAL EXAM:\par Awake and alert\par Clear speech\par Grossly looks in all directs\par No obvious facial asymmetry \par Hearing intact \par Moves all EXTs spontaneously\par Ambulates independently\par

## 2022-02-09 NOTE — REASON FOR VISIT
[Follow-Up: _____] : a [unfilled] follow-up visit [Formal Caregiver] : formal caregiver [FreeTextEntry1] : epilepsy
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
## 2022-05-03 ENCOUNTER — APPOINTMENT (OUTPATIENT)
Dept: NEUROLOGY | Facility: CLINIC | Age: 27
End: 2022-05-03
Payer: MEDICAID

## 2022-05-03 VITALS
TEMPERATURE: 98.7 F | WEIGHT: 245 LBS | OXYGEN SATURATION: 97 % | DIASTOLIC BLOOD PRESSURE: 81 MMHG | BODY MASS INDEX: 32.47 KG/M2 | SYSTOLIC BLOOD PRESSURE: 119 MMHG | HEIGHT: 73 IN | HEART RATE: 126 BPM

## 2022-05-03 PROCEDURE — 99214 OFFICE O/P EST MOD 30 MIN: CPT
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
## 2022-05-03 NOTE — PHYSICAL EXAM
[FreeTextEntry1] : GENERAL PHYSICAL EXAM:\par GEN: no distress\par HEENT: NCAT, OP clear\par EYES: sclera white, conjunctiva clear, no nystagmus\par NECK: supple\par CV: RRR    		\par PULM: CTAB, no wheezing\par GI: soft ABD, +BS, NT, ND\par EXT: peripheral pulse intact, no cyanosis\par MSK: muscle tone and strength normal\par SKIN: warm, dry, no rash or lesion on exposed skin \par \par NEUROLOGICAL EXAM:\par Mental Status\par Orientation: awake and alert \par Language: clear and fluent\par \par Cranial Nerves\par II: full visual fields intact \par III, IV, VI: PERRL, EOMI\par V, VII: facial sensation and movement intact and symmetric \par VIII: hearing intact \par IX, X: uvula midline, soft palate elevates normally \par XI: BL shoulder shrug intact \par XII: tongue midline\par \par Motor\par 5/5 in RUE, RLE\par 5/5 in LUE, LLE             \par Tone and bulk are normal in upper and lower limbs\par No pronator drift\par \par Sensation\par Intact to light touch and pinprick in all 4 EXTs\par \par Reflex\par 2+ in BL biceps, triceps, brachioradialis, patella, ankle                                    \par Plantar responses downward bilaterally\par \par Coordination\par Normal FTN bilaterally\par \par Gait\par Ambulates independently \par \par \par
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
## 2022-05-06 ENCOUNTER — APPOINTMENT (OUTPATIENT)
Dept: NEUROLOGY | Facility: CLINIC | Age: 27
End: 2022-05-06

## 2022-10-31 ENCOUNTER — NON-APPOINTMENT (OUTPATIENT)
Age: 27
End: 2022-10-31

## 2022-11-04 ENCOUNTER — APPOINTMENT (OUTPATIENT)
Dept: NEUROLOGY | Facility: CLINIC | Age: 27
End: 2022-11-04

## 2022-11-04 VITALS
HEART RATE: 133 BPM | OXYGEN SATURATION: 98 % | DIASTOLIC BLOOD PRESSURE: 79 MMHG | SYSTOLIC BLOOD PRESSURE: 116 MMHG | HEIGHT: 61 IN | TEMPERATURE: 98.7 F | WEIGHT: 238 LBS | BODY MASS INDEX: 44.93 KG/M2

## 2022-11-04 PROCEDURE — 99213 OFFICE O/P EST LOW 20 MIN: CPT
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
## 2022-11-04 NOTE — PHYSICAL EXAM
[FreeTextEntry1] : GENERAL PHYSICAL EXAM:\par GEN: no distress\par HEENT: NCAT, OP clear\par EYES: sclera white, conjunctiva clear, no nystagmus\par NECK: supple\par CV: RRR    		\par PULM: CTAB, no wheezing\par GI: soft ABD, +BS, NT, ND\par EXT: peripheral pulse intact, no cyanosis\par MSK: muscle tone and strength normal\par SKIN: warm, dry, no rash or lesion on exposed skin \par \par NEUROLOGICAL EXAM:\par Mental Status\par Orientation: awake and alert \par Language: clear and fluent\par \par Cranial Nerves\par II: full visual fields intact \par III, IV, VI: PERRL, EOMI\par V, VII: facial sensation and movement intact and symmetric \par VIII: hearing intact \par IX, X: uvula midline, soft palate elevates normally \par XI: BL shoulder shrug intact \par XII: tongue midline\par \par Motor\par 5/5 in RUE, RLE\par 5/5 in LUE, LLE             \par Tone and bulk are normal in upper and lower limbs\par No pronator drift\par \par Sensation\par Intact to light touch in all 4 EXTs\par \par Reflex\par 2+ in BL biceps, triceps, brachioradialis, patella, ankle                                    \par \par Coordination\par Normal FTN bilaterally\par Fine HF/LA intention tremor of BL hands with hand extension\par \par Coordination\par Normal FTN bilaterally\par \par Gait\par Ambulates independently \par \par \par

## 2023-03-15 NOTE — PATIENT PROFILE ADULT - STATED REASON FOR ADMISSION
My signature below certifies that the above stated patient is homebound and upon completion of the Face-To-Face encounter, has the need for intermittent skilled nursing, physical therapy and/or speech or occupational therapy services in their home for their current diagnosis as outlined in their initial plan of care. These services will continue to be monitored by myself or another physician. EMU

## 2023-05-04 ENCOUNTER — APPOINTMENT (OUTPATIENT)
Dept: NEUROLOGY | Facility: CLINIC | Age: 28
End: 2023-05-04
Payer: MEDICAID

## 2023-05-04 VITALS
DIASTOLIC BLOOD PRESSURE: 68 MMHG | HEART RATE: 96 BPM | SYSTOLIC BLOOD PRESSURE: 104 MMHG | TEMPERATURE: 97.7 F | HEIGHT: 73 IN | BODY MASS INDEX: 31.81 KG/M2 | WEIGHT: 240 LBS | OXYGEN SATURATION: 98 %

## 2023-05-04 PROCEDURE — 99213 OFFICE O/P EST LOW 20 MIN: CPT

## 2023-05-04 RX ORDER — BUPROPION HYDROCHLORIDE 150 MG/1
150 TABLET, EXTENDED RELEASE ORAL
Refills: 0 | Status: ACTIVE | COMMUNITY

## 2023-05-04 RX ORDER — CLOZAPINE 25 MG/1
25 TABLET ORAL
Refills: 0 | Status: ACTIVE | COMMUNITY

## 2023-05-04 RX ORDER — METOPROLOL TARTRATE 25 MG/1
25 TABLET, FILM COATED ORAL
Refills: 0 | Status: ACTIVE | COMMUNITY

## 2023-05-04 RX ORDER — BISACODYL 10 MG/1
SUPPOSITORY RECTAL
Refills: 0 | Status: ACTIVE | COMMUNITY

## 2023-05-04 RX ORDER — DOCUSATE SODIUM AND SENNOSIDES 50; 8.6 MG/1; MG/1
8.6-5 TABLET, FILM COATED ORAL
Refills: 0 | Status: ACTIVE | COMMUNITY

## 2023-05-04 RX ORDER — BUPROPION HYDROCHLORIDE 300 MG/1
300 TABLET, EXTENDED RELEASE ORAL
Refills: 0 | Status: ACTIVE | COMMUNITY
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
## 2024-04-25 ENCOUNTER — RX RENEWAL (OUTPATIENT)
Age: 29
End: 2024-04-25

## 2024-04-25 RX ORDER — DIVALPROEX SODIUM 500 MG/1
500 TABLET, DELAYED RELEASE ORAL
Qty: 60 | Refills: 4 | Status: ACTIVE | COMMUNITY
Start: 2021-10-07 | End: 1900-01-01

## 2024-05-09 ENCOUNTER — APPOINTMENT (OUTPATIENT)
Dept: NEUROLOGY | Facility: CLINIC | Age: 29
End: 2024-05-09
Payer: MEDICARE

## 2024-05-09 VITALS
OXYGEN SATURATION: 98 % | HEIGHT: 73 IN | BODY MASS INDEX: 29.42 KG/M2 | DIASTOLIC BLOOD PRESSURE: 69 MMHG | SYSTOLIC BLOOD PRESSURE: 101 MMHG | WEIGHT: 222 LBS | HEART RATE: 86 BPM

## 2024-05-09 DIAGNOSIS — G40.309 GENERALIZED IDIOPATHIC EPILEPSY AND EPILEPTIC SYNDROMES, NOT INTRACTABLE, W/OUT STATUS EPILEPTICUS: ICD-10-CM

## 2024-05-09 DIAGNOSIS — R56.9 UNSPECIFIED CONVULSIONS: ICD-10-CM

## 2024-05-09 PROCEDURE — 99213 OFFICE O/P EST LOW 20 MIN: CPT
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
## 2025-05-08 ENCOUNTER — APPOINTMENT (OUTPATIENT)
Dept: NEUROLOGY | Facility: CLINIC | Age: 30
End: 2025-05-08
Payer: MEDICARE

## 2025-05-08 VITALS
WEIGHT: 229 LBS | HEIGHT: 73 IN | BODY MASS INDEX: 30.35 KG/M2 | OXYGEN SATURATION: 98 % | HEART RATE: 90 BPM | DIASTOLIC BLOOD PRESSURE: 79 MMHG | SYSTOLIC BLOOD PRESSURE: 122 MMHG

## 2025-05-08 DIAGNOSIS — G40.309 GENERALIZED IDIOPATHIC EPILEPSY AND EPILEPTIC SYNDROMES, NOT INTRACTABLE, W/OUT STATUS EPILEPTICUS: ICD-10-CM

## 2025-05-08 DIAGNOSIS — Z51.81 ENCOUNTER FOR THERAPEUTIC DRUG LVL MONITORING: ICD-10-CM

## 2025-05-08 PROCEDURE — 99213 OFFICE O/P EST LOW 20 MIN: CPT

## 2025-05-08 PROCEDURE — G2211 COMPLEX E/M VISIT ADD ON: CPT

## 2025-05-08 RX ORDER — CLOZAPINE 50 MG/1
50 TABLET ORAL
Refills: 0 | Status: ACTIVE | COMMUNITY
Start: 2025-05-08

## 2025-05-08 RX ORDER — CLOZAPINE 200 MG/1
200 TABLET ORAL
Refills: 0 | Status: ACTIVE | COMMUNITY
Start: 2025-05-08